# Patient Record
Sex: MALE | Race: WHITE | NOT HISPANIC OR LATINO | Employment: UNEMPLOYED | URBAN - METROPOLITAN AREA
[De-identification: names, ages, dates, MRNs, and addresses within clinical notes are randomized per-mention and may not be internally consistent; named-entity substitution may affect disease eponyms.]

---

## 2021-02-05 ENCOUNTER — HISTORICAL (OUTPATIENT)
Dept: ADMINISTRATIVE | Facility: HOSPITAL | Age: 46
End: 2021-02-05

## 2021-02-05 LAB
FLUAV AG UPPER RESP QL IA.RAPID: NEGATIVE
FLUBV AG UPPER RESP QL IA.RAPID: NEGATIVE
SARS-COV-2 RNA RESP QL NAA+PROBE: NEGATIVE
SARS-COV-2 RNA RESP QL NAA+PROBE: NOT DETECTED

## 2021-02-07 LAB — FINAL CULTURE: NORMAL

## 2021-09-02 ENCOUNTER — HISTORICAL (OUTPATIENT)
Dept: INFECTIOUS DISEASES | Facility: HOSPITAL | Age: 46
End: 2021-09-02

## 2022-03-21 ENCOUNTER — HISTORICAL (OUTPATIENT)
Dept: ADMINISTRATIVE | Facility: HOSPITAL | Age: 47
End: 2022-03-21

## 2022-04-10 ENCOUNTER — HISTORICAL (OUTPATIENT)
Dept: ADMINISTRATIVE | Facility: HOSPITAL | Age: 47
End: 2022-04-10
Payer: MEDICAID

## 2022-04-29 VITALS
WEIGHT: 209.44 LBS | OXYGEN SATURATION: 97 % | HEIGHT: 68 IN | SYSTOLIC BLOOD PRESSURE: 145 MMHG | BODY MASS INDEX: 31.74 KG/M2 | DIASTOLIC BLOOD PRESSURE: 96 MMHG

## 2022-05-31 ENCOUNTER — OFFICE VISIT (OUTPATIENT)
Dept: FAMILY MEDICINE | Facility: CLINIC | Age: 47
End: 2022-05-31
Payer: MEDICAID

## 2022-05-31 VITALS
HEIGHT: 67 IN | WEIGHT: 193.63 LBS | HEART RATE: 71 BPM | DIASTOLIC BLOOD PRESSURE: 82 MMHG | SYSTOLIC BLOOD PRESSURE: 123 MMHG | BODY MASS INDEX: 30.39 KG/M2

## 2022-05-31 DIAGNOSIS — I10 HYPERTENSION, UNSPECIFIED TYPE: ICD-10-CM

## 2022-05-31 DIAGNOSIS — Z13.220 LIPID SCREENING: ICD-10-CM

## 2022-05-31 DIAGNOSIS — Z76.89 ENCOUNTER TO ESTABLISH CARE: Primary | ICD-10-CM

## 2022-05-31 DIAGNOSIS — Z87.898 HISTORY OF SUBSTANCE USE: ICD-10-CM

## 2022-05-31 DIAGNOSIS — Z72.0 VAPES NICOTINE CONTAINING SUBSTANCE: ICD-10-CM

## 2022-05-31 DIAGNOSIS — Z12.5 SCREENING PSA (PROSTATE SPECIFIC ANTIGEN): ICD-10-CM

## 2022-05-31 DIAGNOSIS — F41.9 ANXIETY: ICD-10-CM

## 2022-05-31 PROCEDURE — 3079F PR MOST RECENT DIASTOLIC BLOOD PRESSURE 80-89 MM HG: ICD-10-PCS | Mod: CPTII,,, | Performed by: NURSE PRACTITIONER

## 2022-05-31 PROCEDURE — 3074F PR MOST RECENT SYSTOLIC BLOOD PRESSURE < 130 MM HG: ICD-10-PCS | Mod: CPTII,,, | Performed by: NURSE PRACTITIONER

## 2022-05-31 PROCEDURE — 1159F MED LIST DOCD IN RCRD: CPT | Mod: CPTII,,, | Performed by: NURSE PRACTITIONER

## 2022-05-31 PROCEDURE — 4010F ACE/ARB THERAPY RXD/TAKEN: CPT | Mod: CPTII,,, | Performed by: NURSE PRACTITIONER

## 2022-05-31 PROCEDURE — 4010F PR ACE/ARB THEARPY RXD/TAKEN: ICD-10-PCS | Mod: CPTII,,, | Performed by: NURSE PRACTITIONER

## 2022-05-31 PROCEDURE — 3074F SYST BP LT 130 MM HG: CPT | Mod: CPTII,,, | Performed by: NURSE PRACTITIONER

## 2022-05-31 PROCEDURE — 3008F PR BODY MASS INDEX (BMI) DOCUMENTED: ICD-10-PCS | Mod: CPTII,,, | Performed by: NURSE PRACTITIONER

## 2022-05-31 PROCEDURE — 1159F PR MEDICATION LIST DOCUMENTED IN MEDICAL RECORD: ICD-10-PCS | Mod: CPTII,,, | Performed by: NURSE PRACTITIONER

## 2022-05-31 PROCEDURE — 99204 OFFICE O/P NEW MOD 45 MIN: CPT | Mod: ,,, | Performed by: NURSE PRACTITIONER

## 2022-05-31 PROCEDURE — 3079F DIAST BP 80-89 MM HG: CPT | Mod: CPTII,,, | Performed by: NURSE PRACTITIONER

## 2022-05-31 PROCEDURE — 3008F BODY MASS INDEX DOCD: CPT | Mod: CPTII,,, | Performed by: NURSE PRACTITIONER

## 2022-05-31 PROCEDURE — 99204 PR OFFICE/OUTPT VISIT, NEW, LEVL IV, 45-59 MIN: ICD-10-PCS | Mod: ,,, | Performed by: NURSE PRACTITIONER

## 2022-05-31 RX ORDER — BUPROPION HYDROCHLORIDE 150 MG/1
150 TABLET ORAL DAILY
Qty: 30 TABLET | Refills: 11 | Status: SHIPPED | OUTPATIENT
Start: 2022-05-31 | End: 2023-12-06

## 2022-05-31 RX ORDER — OLMESARTAN MEDOXOMIL 20 MG/1
20 TABLET ORAL DAILY
Qty: 90 TABLET | Refills: 3 | Status: SHIPPED | OUTPATIENT
Start: 2022-05-31 | End: 2023-08-18

## 2022-05-31 RX ORDER — HYDROCHLOROTHIAZIDE 12.5 MG/1
12.5 CAPSULE ORAL DAILY
COMMUNITY
Start: 2022-04-29 | End: 2022-05-31 | Stop reason: SDUPTHER

## 2022-05-31 RX ORDER — HYDROCHLOROTHIAZIDE 12.5 MG/1
12.5 CAPSULE ORAL DAILY
Qty: 30 CAPSULE | Refills: 0 | Status: SHIPPED | OUTPATIENT
Start: 2022-05-31 | End: 2022-07-08

## 2022-05-31 NOTE — PROGRESS NOTES
Subjective:       Patient ID: Trever Mcduffie is a 46 y.o. male.    Chief Complaint: Hypertension, Depression, and Anxiety (Since lost weight taking half dose b/p x few weeks)      HPI   This is a 46-year-old  male presents to clinic today to establish as a new patient.  Patient reports like his PCP Dr. Adams.  Patient reports lasting about 6-8 months ago.  Patient with medical history of hypertension, anxiety, history heroin abuse, smoker patient vapes   Sobriety for 3 years.  Patient is requesting to restart Wellbutrin to help with anxiety and smoking cessation  .  Patient also reports for the last few months he has only been taking half of his only source and because he noticed his blood pressure had been dropping in afternoon to 90s   Doing well today denies SI, HI.  Denies chest pain , shortness of breath , dizziness or blurred vision Patient is a patient care tech at [employer]  Review of Systems   Constitutional: Negative for activity change and fatigue.   Respiratory: Negative for chest tightness and shortness of breath.    Cardiovascular: Negative for chest pain and palpitations.   Gastrointestinal: Negative for abdominal pain.   Neurological: Negative for dizziness, weakness and headaches.   Psychiatric/Behavioral: Negative for self-injury and suicidal ideas.   All other systems reviewed and are negative.          The patient's Health Maintenance was reviewed and the following appears to be due:   Health Maintenance Due   Topic Date Due    Hepatitis C Screening  Never done    Lipid Panel  Never done    COVID-19 Vaccine (1) Never done    Pneumococcal Vaccines (Age 0-64) (1 - PCV) Never done    HIV Screening  Never done    TETANUS VACCINE  Never done    Colorectal Cancer Screening  Never done       Past Medical History:  Past Medical History:   Diagnosis Date    Anxiety     Depression     Hypertension      Past Surgical History:   Procedure Laterality Date    HERNIA REPAIR  "     JOINT REPLACEMENT      right index finger  Right      Review of patient's allergies indicates:   Allergen Reactions    Meperidine Hives     Current Outpatient Medications on File Prior to Visit   Medication Sig Dispense Refill    hydroCHLOROthiazide (MICROZIDE) 12.5 mg capsule Take 12.5 mg by mouth once daily.      olmesartan medoxomil (OLMESARTAN ORAL) Take 40 mg by mouth once daily at 6am. Starting cutting in half x weeks on own       No current facility-administered medications on file prior to visit.     Social History     Socioeconomic History    Marital status:    Tobacco Use    Smoking status: Current Every Day Smoker    Smokeless tobacco: Never Used   Substance and Sexual Activity    Alcohol use: Never    Drug use: Never    Sexual activity: Not Currently     Family History   Problem Relation Age of Onset    Arthritis Mother     Heart disease Mother     Cancer Father          Objective:       /82   Pulse 71   Ht 5' 7" (1.702 m)   Wt 87.8 kg (193 lb 9.6 oz)   BMI 30.32 kg/m²      Physical Exam  Constitutional:       Appearance: Normal appearance.   HENT:      Head: Normocephalic.   Eyes:      Pupils: Pupils are equal, round, and reactive to light.   Cardiovascular:      Rate and Rhythm: Normal rate.      Pulses: Normal pulses.   Pulmonary:      Effort: Pulmonary effort is normal.   Abdominal:      General: Bowel sounds are normal.      Palpations: Abdomen is soft.   Musculoskeletal:         General: Normal range of motion.      Cervical back: Neck supple.   Skin:     General: Skin is warm and dry.   Neurological:      Mental Status: He is alert and oriented to person, place, and time.   Psychiatric:         Mood and Affect: Mood normal.         Behavior: Behavior normal.         Judgment: Judgment normal.           Assessment/Plan   1. Encounter to establish care  -     PSA, Screening  -     CBC Auto Differential  -     Comprehensive Metabolic Panel  -     Lipid Panel  -    "  Vitamin D  -     TSH  -     Hemoglobin A1C    2. Hypertension, unspecified type  -     CBC Auto Differential  -     Comprehensive Metabolic Panel  -     Lipid Panel  -     TSH  -     Hemoglobin A1C  Change olmmesartan 20 mg tablet once a day  Controlled with medication.   Continue current medication as prescribed   Low salt/ DASH diet   Discussed lifestyle modifications.   encourage aerobic excise at least 30 mins a day   Monitor BP daily goal less than 140/90.   Denies headaches, blurred vision, or dizziness      3. Vapes nicotine containing substance  Encouraged smoking cessation  4. Lipid screening  -     Comprehensive Metabolic Panel  -     Lipid Panel  -     Hemoglobin A1C    5. Screening PSA (prostate specific antigen)  -     PSA, Screening    6. History of substance use  Sober 3 years   7. Anxiety  Reinitiate Wellbutrin 150 mg daily       Follow up in about 3 weeks (around 6/21/2022) for Wellness.

## 2022-05-31 NOTE — PATIENT INSTRUCTIONS
Controlled with medication.   Continue current medication as prescribed   Low salt/ DASH diet   Discussed lifestyle modifications.   encourage aerobic excise at least 30 mins a day   Monitor BP daily goal less than 140/90.   Denies headaches, blurred vision, or dizziness

## 2022-11-21 DIAGNOSIS — I10 HYPERTENSION, UNSPECIFIED TYPE: ICD-10-CM

## 2022-11-21 RX ORDER — HYDROCHLOROTHIAZIDE 12.5 MG/1
12.5 CAPSULE ORAL DAILY
Qty: 30 CAPSULE | Refills: 0 | Status: SHIPPED | OUTPATIENT
Start: 2022-11-21 | End: 2023-12-06

## 2023-04-08 ENCOUNTER — HOSPITAL ENCOUNTER (EMERGENCY)
Facility: HOSPITAL | Age: 48
Discharge: HOME | End: 2023-04-08
Attending: SPECIALIST
Payer: MEDICAID

## 2023-04-08 VITALS
HEIGHT: 67 IN | OXYGEN SATURATION: 98 % | HEART RATE: 106 BPM | DIASTOLIC BLOOD PRESSURE: 85 MMHG | WEIGHT: 195 LBS | RESPIRATION RATE: 20 BRPM | TEMPERATURE: 98 F | SYSTOLIC BLOOD PRESSURE: 138 MMHG | BODY MASS INDEX: 30.61 KG/M2

## 2023-04-08 DIAGNOSIS — L25.9 CONTACT DERMATITIS, UNSPECIFIED CONTACT DERMATITIS TYPE, UNSPECIFIED TRIGGER: Primary | ICD-10-CM

## 2023-04-08 PROCEDURE — 63600175 PHARM REV CODE 636 W HCPCS: Performed by: SPECIALIST

## 2023-04-08 PROCEDURE — 25000003 PHARM REV CODE 250: Performed by: SPECIALIST

## 2023-04-08 PROCEDURE — 96372 THER/PROPH/DIAG INJ SC/IM: CPT | Performed by: SPECIALIST

## 2023-04-08 PROCEDURE — 99284 EMERGENCY DEPT VISIT MOD MDM: CPT

## 2023-04-08 RX ORDER — PREDNISONE 20 MG/1
20 TABLET ORAL 2 TIMES DAILY
Qty: 6 TABLET | Refills: 0 | Status: SHIPPED | OUTPATIENT
Start: 2023-04-08 | End: 2023-04-11

## 2023-04-08 RX ORDER — CEPHALEXIN 500 MG/1
500 CAPSULE ORAL
Status: COMPLETED | OUTPATIENT
Start: 2023-04-08 | End: 2023-04-08

## 2023-04-08 RX ORDER — DEXAMETHASONE SODIUM PHOSPHATE 4 MG/ML
8 INJECTION, SOLUTION INTRA-ARTICULAR; INTRALESIONAL; INTRAMUSCULAR; INTRAVENOUS; SOFT TISSUE
Status: COMPLETED | OUTPATIENT
Start: 2023-04-08 | End: 2023-04-08

## 2023-04-08 RX ORDER — CEPHALEXIN 500 MG/1
500 CAPSULE ORAL EVERY 12 HOURS
Qty: 14 CAPSULE | Refills: 0 | Status: SHIPPED | OUTPATIENT
Start: 2023-04-08 | End: 2023-04-15

## 2023-04-08 RX ADMIN — DEXAMETHASONE SODIUM PHOSPHATE 8 MG: 4 INJECTION, SOLUTION INTRA-ARTICULAR; INTRALESIONAL; INTRAMUSCULAR; INTRAVENOUS; SOFT TISSUE at 08:04

## 2023-04-08 RX ADMIN — CEPHALEXIN 500 MG: 500 CAPSULE ORAL at 08:04

## 2023-04-09 NOTE — ED PROVIDER NOTES
"Encounter Date: 4/8/2023       History     Chief Complaint   Patient presents with    Insect Bite     Pt was working in his yard a few days ago, did not feel any bite or sting but that night he felt itchy, reddened area and applied cream/ L lower leg area removed a black "stinger"/ today noticed the area looked infected and became concerned.     Patient with bilateral lower extremity rash also on right forearm after working in the yard a few days ago; thought he might have had a thorn or a stinger in his left lower leg and he pulled it out in the area became more inflamed    The history is provided by the patient.   Review of patient's allergies indicates:   Allergen Reactions    Meperidine Hives     Past Medical History:   Diagnosis Date    Anxiety     Depression     Hypertension      Past Surgical History:   Procedure Laterality Date    HERNIA REPAIR      JOINT REPLACEMENT      right index finger  Right      Family History   Problem Relation Age of Onset    Arthritis Mother     Heart disease Mother     Cancer Father      Social History     Tobacco Use    Smoking status: Every Day    Smokeless tobacco: Never   Substance Use Topics    Alcohol use: Never    Drug use: Never     Review of Systems   Constitutional: Negative.    HENT: Negative.     Respiratory: Negative.     Cardiovascular: Negative.    Gastrointestinal: Negative.    Genitourinary: Negative.    Musculoskeletal: Negative.    Neurological: Negative.      Physical Exam     Initial Vitals [04/08/23 2031]   BP Pulse Resp Temp SpO2   138/85 106 20 97.8 °F (36.6 °C) 98 %      MAP       --         Physical Exam    Nursing note and vitals reviewed.  Constitutional: He appears well-developed and well-nourished.   HENT:   Head: Normocephalic and atraumatic.   Eyes: EOM are normal. Pupils are equal, round, and reactive to light.   Neck:   Normal range of motion.  Cardiovascular:  Normal rate, regular rhythm and normal heart sounds.           Pulmonary/Chest: Breath " sounds normal.   Abdominal: Abdomen is soft.   Musculoskeletal:         General: Normal range of motion.      Cervical back: Normal range of motion.     Neurological: He is alert and oriented to person, place, and time.   Skin: Skin is warm and dry.   Erythematous eruption bilateral lower extremities and left forearm       ED Course   Procedures  Labs Reviewed - No data to display       Imaging Results    None          Medications   dexAMETHasone injection 8 mg (8 mg Intramuscular Given 4/8/23 2056)   cephALEXin capsule 500 mg (500 mg Oral Given 4/8/23 2056)     Medical Decision Making:   Differential Diagnosis:   Contact dermatitis, insect bite, cellulitis  ED Management:  Will give steroid and antibiotic                        Clinical Impression:   Final diagnoses:  [L25.9] Contact dermatitis, unspecified contact dermatitis type, unspecified trigger (Primary)        ED Disposition Condition    Discharge Stable          ED Prescriptions       Medication Sig Dispense Start Date End Date Auth. Provider    predniSONE (DELTASONE) 20 MG tablet Take 1 tablet (20 mg total) by mouth 2 (two) times daily. for 3 days 6 tablet 4/8/2023 4/11/2023 Marcell Watters MD    cephALEXin (KEFLEX) 500 MG capsule Take 1 capsule (500 mg total) by mouth every 12 (twelve) hours. for 7 days 14 capsule 4/8/2023 4/15/2023 Marcell Watters MD          Follow-up Information       Follow up With Specialties Details Why Contact [facility] In 3 days As needed [address]  [facility] [address]  [phone]               Marcell Watters MD  04/08/23 9023

## 2023-08-18 DIAGNOSIS — I10 HYPERTENSION, UNSPECIFIED TYPE: ICD-10-CM

## 2023-08-18 RX ORDER — OLMESARTAN MEDOXOMIL 20 MG/1
20 TABLET ORAL
Qty: 30 TABLET | Refills: 1 | Status: SHIPPED | OUTPATIENT
Start: 2023-08-18 | End: 2023-11-06

## 2023-11-05 DIAGNOSIS — I10 HYPERTENSION, UNSPECIFIED TYPE: ICD-10-CM

## 2023-11-06 RX ORDER — OLMESARTAN MEDOXOMIL 20 MG/1
20 TABLET ORAL
Qty: 30 TABLET | Refills: 0 | Status: SHIPPED | OUTPATIENT
Start: 2023-11-06 | End: 2023-12-06 | Stop reason: SDUPTHER

## 2023-11-28 DIAGNOSIS — Z12.5 SCREENING PSA (PROSTATE SPECIFIC ANTIGEN): ICD-10-CM

## 2023-11-28 DIAGNOSIS — Z00.00 WELLNESS EXAMINATION: Primary | ICD-10-CM

## 2023-12-06 ENCOUNTER — OFFICE VISIT (OUTPATIENT)
Dept: FAMILY MEDICINE | Facility: CLINIC | Age: 48
End: 2023-12-06
Payer: MEDICAID

## 2023-12-06 VITALS
RESPIRATION RATE: 18 BRPM | HEART RATE: 82 BPM | DIASTOLIC BLOOD PRESSURE: 86 MMHG | OXYGEN SATURATION: 98 % | BODY MASS INDEX: 35.14 KG/M2 | TEMPERATURE: 98 F | WEIGHT: 223.88 LBS | SYSTOLIC BLOOD PRESSURE: 128 MMHG | HEIGHT: 67 IN

## 2023-12-06 DIAGNOSIS — Z12.11 SCREENING FOR MALIGNANT NEOPLASM OF COLON: ICD-10-CM

## 2023-12-06 DIAGNOSIS — Z72.0 VAPES NICOTINE CONTAINING SUBSTANCE: ICD-10-CM

## 2023-12-06 DIAGNOSIS — Z12.5 SCREENING FOR PROSTATE CANCER: ICD-10-CM

## 2023-12-06 DIAGNOSIS — Z00.00 WELLNESS EXAMINATION: Primary | ICD-10-CM

## 2023-12-06 DIAGNOSIS — I10 HYPERTENSION, UNSPECIFIED TYPE: ICD-10-CM

## 2023-12-06 PROCEDURE — 99406 PR TOBACCO USE CESSATION INTERMEDIATE 3-10 MINUTES: ICD-10-PCS | Mod: ,,, | Performed by: NURSE PRACTITIONER

## 2023-12-06 PROCEDURE — 99396 PR PREVENTIVE VISIT,EST,40-64: ICD-10-PCS | Mod: 25,,, | Performed by: NURSE PRACTITIONER

## 2023-12-06 PROCEDURE — 3079F DIAST BP 80-89 MM HG: CPT | Mod: CPTII,,, | Performed by: NURSE PRACTITIONER

## 2023-12-06 PROCEDURE — 3008F BODY MASS INDEX DOCD: CPT | Mod: CPTII,,, | Performed by: NURSE PRACTITIONER

## 2023-12-06 PROCEDURE — 1159F PR MEDICATION LIST DOCUMENTED IN MEDICAL RECORD: ICD-10-PCS | Mod: CPTII,,, | Performed by: NURSE PRACTITIONER

## 2023-12-06 PROCEDURE — 3074F PR MOST RECENT SYSTOLIC BLOOD PRESSURE < 130 MM HG: ICD-10-PCS | Mod: CPTII,,, | Performed by: NURSE PRACTITIONER

## 2023-12-06 PROCEDURE — 3074F SYST BP LT 130 MM HG: CPT | Mod: CPTII,,, | Performed by: NURSE PRACTITIONER

## 2023-12-06 PROCEDURE — 4010F PR ACE/ARB THEARPY RXD/TAKEN: ICD-10-PCS | Mod: CPTII,,, | Performed by: NURSE PRACTITIONER

## 2023-12-06 PROCEDURE — 1159F MED LIST DOCD IN RCRD: CPT | Mod: CPTII,,, | Performed by: NURSE PRACTITIONER

## 2023-12-06 PROCEDURE — 4010F ACE/ARB THERAPY RXD/TAKEN: CPT | Mod: CPTII,,, | Performed by: NURSE PRACTITIONER

## 2023-12-06 PROCEDURE — 99406 BEHAV CHNG SMOKING 3-10 MIN: CPT | Mod: ,,, | Performed by: NURSE PRACTITIONER

## 2023-12-06 PROCEDURE — 99396 PREV VISIT EST AGE 40-64: CPT | Mod: 25,,, | Performed by: NURSE PRACTITIONER

## 2023-12-06 PROCEDURE — 3079F PR MOST RECENT DIASTOLIC BLOOD PRESSURE 80-89 MM HG: ICD-10-PCS | Mod: CPTII,,, | Performed by: NURSE PRACTITIONER

## 2023-12-06 PROCEDURE — 3008F PR BODY MASS INDEX (BMI) DOCUMENTED: ICD-10-PCS | Mod: CPTII,,, | Performed by: NURSE PRACTITIONER

## 2023-12-06 RX ORDER — OLMESARTAN MEDOXOMIL 20 MG/1
20 TABLET ORAL DAILY
Qty: 30 TABLET | Refills: 2 | Status: SHIPPED | OUTPATIENT
Start: 2023-12-06 | End: 2024-02-28

## 2023-12-06 NOTE — PROGRESS NOTES
SUBJECTIVE:     History of Present Illness      Chief Complaint: Follow-up (Returns for medication refills.  No complaints at this time.  Needs to do labs & wellness visit.) and Annual Exam    HPI:  Patient is a 48 y.o. year old male who presents to clinic for wellness and medication refill.    Patient has not completed his lab work at this time.    Requesting refill on losartan.   Last visit in clinic May 2022.    Patient reports general lifestyle is healthy.    Drinks 1 cup of coffee or soda daily.   Patient does vape nicotine containing products.   Sober for 3 years os substance abuse   Pt wears glasses, reports his  last eye exam a year ago .    Patient has no complaints today.  Denies chest pain, shortness of breath, blurred vision, palpitations    Review of Systems:    Review of Systems    12 point review of systems conducted, negative except as stated in the history of present illness. See HPI for details.     Previous History    Shantel Bush, [facility]  Review of patient's allergies indicates:   Allergen Reactions    Meperidine Hives       Past Medical History:   Diagnosis Date    Anxiety     Depression     Hypertension      Current Outpatient Medications   Medication Instructions    olmesartan (BENICAR) 20 mg, Oral, Daily     Past Surgical History:   Procedure Laterality Date    HERNIA REPAIR      JOINT REPLACEMENT      right index finger  Right      Family History   Problem Relation Age of Onset    Arthritis Mother     Heart disease Mother     Cancer Father        Social History     Tobacco Use    Smoking status: Every Day     Types: Vaping with nicotine    Smokeless tobacco: Never   Substance Use Topics    Alcohol use: Never    Drug use: Never        Health Maintenance      Health Maintenance   Topic Date Due    Hepatitis C Screening  Never done    Lipid Panel  Never done    Colorectal Cancer Screening  Never done    TETANUS VACCINE  04/30/2032       OBJECTIVE:     Physical Exam      Vital Signs Reviewed  "  Visit Vitals  /86 (BP Location: Left arm, Patient Position: Sitting)   Pulse 82   Temp 97.8 °F (36.6 °C) (Oral)   Resp 18   Ht 5' 7" (1.702 m)   Wt 101.6 kg (223 lb 14.4 oz)   SpO2 98%   BMI 35.07 kg/m²       Physical Exam    Physical Exam:  General: Alert, well nourished, no acute distress, non-toxic appearing.   Eyes: Anicteric sclera, without conjunctival injection, normal lids, no purulent drainage, EOMs grossly intact.   Ears: No tragal tenderness. Tympanic membranes intact, pearly grey, without effusion or erythema and with a positive light reflex.   Mouth: Posterior pharynx without erythema. No exudate, ulcerations, or lesion. No tonsillar swelling.   Neck: Supple, full ROM, no rigidity, no cervical adenopathy.   Cardio: Normal rate and rhythm    Resp: Respirations even and unlabored, clear to auscultation bilaterally.   Abd: No ecchymosis or distension. Normal bowel sounds in all 4 quadrants. No tenderness to palpation. No rebound tenderness or guarding. No CVA tenderness.   Skin: No rashes or open lesions noted.   MSK: No swelling. No abrasions or signs of trauma. Ambulating without assistance.   Neuro: Alert,oriented No focal deficits noted. Facial expressions even.   Psych: Cooperative, Normal affect      Procedures    Procedures     Labs     Results for orders placed or performed in visit on 02/05/21   Strep Only Culture    Specimen: Throat   Result Value Ref Range    FINAL CULTURE No growth of Beta Strep    COVID-19 Routine Screening   Result Value Ref Range    SARS-CoV-2 PCR Negative    FLU A & B PCR   Result Value Ref Range    Influenza A PCR Negative >Negative    Influenza B PCR Negative >Negative   COVID-19 Routine Screening   Result Value Ref Range    SARS-CoV-2 PCR Not Detected >Not Detected       Chemistry:  Lab Results   Component Value Date     04/29/2022    K 4.5 04/29/2022    CHLORIDE 103 04/29/2022    BUN 14.9 04/29/2022    CREATININE 0.98 04/29/2022    GLUCOSE 92 04/29/2022    " "CALCIUM 9.5 04/29/2022    ALKPHOS 59 04/29/2022    LABPROT 7.2 04/29/2022    ALBUMIN 4.1 04/29/2022    BILIDIR 0.3 04/29/2022    IBILI 0.40 04/29/2022    AST 20 04/29/2022    ALT 20 04/29/2022    TSH 0.166 (L) 05/26/2018        No results found for: "HGBA1C", "MICROALBCREA"     Hematology:  Lab Results   Component Value Date    WBC 7.2 03/21/2022    HGB 14.6 03/21/2022    HCT 43.0 03/21/2022     03/21/2022       Lipid Panel:  No results found for: "CHOL", "HDL", "LDL", "TRIG", "TOTALCHOLEST"     Urine:  Lab Results   Component Value Date    PHUA 6.0 05/26/2018         Assessment            ICD-10-CM ICD-9-CM   1. Wellness examination  Z00.00 V70.0   2. Hypertension, unspecified type  I10 401.9   3. Screening for malignant neoplasm of colon  Z12.11 V76.51   4. Screening for prostate cancer  Z12.5 V76.44   5. Vapes nicotine containing substance  Z72.0 305.1       Plan       1. Hypertension, unspecified type  - olmesartan (BENICAR) 20 MG tablet; Take 1 tablet (20 mg total) by mouth once daily.  Dispense: 30 tablet; Refill: 2    Stable Controlled with medication.   Continue current medication as prescribed   Low salt/ DASH diet   Discussed lifestyle modifications.   encourage aerobic excise at least 30 mins a day   Monitor BP daily goal less than 140/90.   Denies headaches, blurred vision, or dizziness      2. Wellness examination  Discussed  importance of completing labs and preventative screenings    Labs not completed   Overall health status reviewed.    Significant chronic conditions addressed, including ongoing treatment plans.   Good health habits reinforced.    Cardiovascular disease risk factors discussed.   Appropriate recommendations and preventative care medical   information provided with annual wellness exams encouraged.  Vaccination status     Colon  PSA ordered      3. Screening for malignant neoplasm of colon  - Ambulatory referral/consult to General Surgery; Future  Dr. AGATA Wilson     4. " Screening for prostate cancer  PSA ordered     5. Vapes nicotine containing substance  The patient was counseled on the dangers of tobacco use, and was advised to quit.  Reviewed strategies to maximize success, including substitution of other forms of reinforcement.    Orders Placed This Encounter    Ambulatory referral/consult to General Surgery    olmesartan (BENICAR) 20 MG tablet      Medication List with Changes/Refills   Changed and/or Refilled Medications    Modified Medication Previous Medication    OLMESARTAN (BENICAR) 20 MG TABLET olmesartan (BENICAR) 20 MG tablet       Take 1 tablet (20 mg total) by mouth once daily.    TAKE 1 TABLET(20 MG) BY MOUTH EVERY DAY   Discontinued Medications    BUPROPION (WELLBUTRIN XL) 150 MG TB24 TABLET    Take 1 tablet (150 mg total) by mouth once daily.    HYDROCHLOROTHIAZIDE (MICROZIDE) 12.5 MG CAPSULE    Take 1 capsule (12.5 mg total) by mouth once daily.       Follow up in about 3 months (around 3/6/2024) for 3 month.   Follow up in about 3 months (around 3/6/2024) for 3 month. In addition to their scheduled follow up, the patient has also been instructed to follow up on as needed basis.   Future Appointments   Date Time Provider Department Center   3/6/2024  8:30 AM Shantel Bush FNP [facility]

## 2023-12-06 NOTE — PATIENT INSTRUCTIONS
Valente Perera,     If you are due for any health screening(s) below please notify me so we can arrange them to be ordered and scheduled. Most healthy patients at your age complete them, but you are free to accept or refuse.     If you can't do it, I'll definitely understand. If you can, I'd certainly appreciate it!    Tests to Keep You Healthy    Colon Cancer Screening: DUE  Last Blood Pressure <= 139/89 (12/6/2023): NO      Its time for your colon cancer screening     Colorectal cancer is one of the leading causes of cancer death for men and women but it doesnt have to be. Screenings can prevent colorectal cancer or find it early enough to treat and cure the disease.     Our records indicate that you may be overdue for colon cancer screening. A colonoscopy or stool screening test can help identify patients at risk for developing colon cancer. Cancer screenings save lives, so schedule yours today to stay healthy.     A colonoscopy is the preferred test for detecting colon cancer. It is needed only once every 10 years if results are negative. While you are sedated, a flexible, lighted tube with a tiny camera is inserted into the rectum and advanced through the colon to look for cancers.     An alternative screening test that is used at home and returned to the lab may also be used. It detects hidden blood in bowel movements which could indicate cancer in the colon. If results are positive, you will need a colonoscopy to determine if the blood is a sign of cancer. This type of follow up (diagnostic) colonoscopy usually requires additional copays as required by your insurance provider.     If you recently had your colon cancer screening performed outside of [facility], please let your Health care team know so that they can update your health record. Please contact your PCP if you have any questions.    Were here to help you quit smoking     Our records indicated that you are still smoking. One of the best  things you can do for your health is to stop smoking and we are here to help.     Talk with your provider about our Smoking Cessation Program and how we can support you on your journey.

## 2024-02-28 DIAGNOSIS — I10 HYPERTENSION, UNSPECIFIED TYPE: ICD-10-CM

## 2024-02-28 RX ORDER — OLMESARTAN MEDOXOMIL 20 MG/1
20 TABLET ORAL
Qty: 30 TABLET | Refills: 2 | Status: SHIPPED | OUTPATIENT
Start: 2024-02-28 | End: 2024-05-28 | Stop reason: SDUPTHER

## 2024-05-25 DIAGNOSIS — I10 HYPERTENSION, UNSPECIFIED TYPE: ICD-10-CM

## 2024-05-28 DIAGNOSIS — I10 HYPERTENSION, UNSPECIFIED TYPE: ICD-10-CM

## 2024-05-28 RX ORDER — OLMESARTAN MEDOXOMIL 20 MG/1
20 TABLET ORAL DAILY
Qty: 30 TABLET | Refills: 2 | Status: SHIPPED | OUTPATIENT
Start: 2024-05-28

## 2024-05-28 RX ORDER — OLMESARTAN MEDOXOMIL 20 MG/1
20 TABLET ORAL
Qty: 30 TABLET | Refills: 2 | OUTPATIENT
Start: 2024-05-28

## 2024-09-10 DIAGNOSIS — I10 HYPERTENSION, UNSPECIFIED TYPE: ICD-10-CM

## 2024-09-10 RX ORDER — OLMESARTAN MEDOXOMIL 20 MG/1
TABLET ORAL
Qty: 30 TABLET | Refills: 2 | Status: SHIPPED | OUTPATIENT
Start: 2024-09-10

## 2024-12-03 DIAGNOSIS — Z00.00 WELLNESS EXAMINATION: ICD-10-CM

## 2024-12-03 DIAGNOSIS — Z12.5 SCREENING PSA (PROSTATE SPECIFIC ANTIGEN): ICD-10-CM

## 2024-12-03 DIAGNOSIS — I10 HYPERTENSION, UNSPECIFIED TYPE: Primary | ICD-10-CM

## 2024-12-04 ENCOUNTER — TELEPHONE (OUTPATIENT)
Dept: FAMILY MEDICINE | Facility: CLINIC | Age: 49
End: 2024-12-04
Payer: MEDICAID

## 2024-12-17 DIAGNOSIS — I10 HYPERTENSION, UNSPECIFIED TYPE: ICD-10-CM

## 2024-12-18 RX ORDER — OLMESARTAN MEDOXOMIL 20 MG/1
TABLET ORAL
Qty: 30 TABLET | Refills: 0 | Status: SHIPPED | OUTPATIENT
Start: 2024-12-18

## 2025-01-06 ENCOUNTER — HOSPITAL ENCOUNTER (EMERGENCY)
Facility: HOSPITAL | Age: 50
Discharge: HOME | End: 2025-01-06
Attending: FAMILY MEDICINE
Payer: COMMERCIAL

## 2025-01-06 VITALS
HEART RATE: 113 BPM | OXYGEN SATURATION: 98 % | SYSTOLIC BLOOD PRESSURE: 151 MMHG | WEIGHT: 215 LBS | HEIGHT: 66 IN | RESPIRATION RATE: 18 BRPM | TEMPERATURE: 98 F | BODY MASS INDEX: 34.55 KG/M2 | DIASTOLIC BLOOD PRESSURE: 85 MMHG

## 2025-01-06 DIAGNOSIS — S46.212A BICEPS MUSCLE TEAR, LEFT, INITIAL ENCOUNTER: Primary | ICD-10-CM

## 2025-01-06 PROCEDURE — 99284 EMERGENCY DEPT VISIT MOD MDM: CPT

## 2025-01-06 RX ORDER — KETOROLAC TROMETHAMINE 10 MG/1
10 TABLET, FILM COATED ORAL EVERY 6 HOURS
Qty: 15 TABLET | Refills: 0 | Status: SHIPPED | OUTPATIENT
Start: 2025-01-06 | End: 2025-01-11

## 2025-01-06 RX ORDER — CYCLOBENZAPRINE HCL 10 MG
10 TABLET ORAL 3 TIMES DAILY PRN
Qty: 15 TABLET | Refills: 0 | Status: SHIPPED | OUTPATIENT
Start: 2025-01-06 | End: 2025-01-11

## 2025-01-06 NOTE — Clinical Note
"Trever Fuller" Froy was seen and treated in our emergency department on 1/6/2025.  He may return to work on 01/13/2025.       If you have any questions or concerns, please don't hesitate to call.      Misbah Franks MD"

## 2025-01-06 NOTE — ED PROVIDER NOTES
"Encounter Date: 1/6/2025       History     Chief Complaint   Patient presents with    Arm Injury     C/o L arm pain and bruising.  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 has not felt a pop in his biceps area appears to have most likely a possible partially turned biceps still has range of motion but does have some bruising and obvious deformity in the biceps muscle we will give some pain control recommend he sees Orthopedics follow up this week patient understands and agrees with the plan        Review of patient's allergies indicates:   Allergen Reactions    Meperidine Hives     Past Medical History:   Diagnosis Date    Anxiety     Depression     Hypertension      Past Surgical History:   Procedure Laterality Date    HERNIA REPAIR      JOINT REPLACEMENT      right index finger  Right      Family History   Problem Relation Name Age of Onset    Arthritis Mother      Heart disease Mother      Cancer Father       Social History     Tobacco Use    Smoking status: Every Day     Types: Vaping with nicotine    Smokeless tobacco: Never   Substance Use Topics    Alcohol use: Never    Drug use: Never     Review of Systems   Musculoskeletal:  Positive for myalgias.   All other systems reviewed and are negative.      Physical Exam     Initial Vitals [01/06/25 1044]   BP Pulse Resp Temp SpO2   (!) 151/85 (!) 113 18 98.3 °F (36.8 °C) 98 %      MAP       --         Physical Exam    Nursing note and vitals reviewed.  Constitutional: He appears well-developed and well-nourished. He is active.   HENT:   Head: Normocephalic and atraumatic.   Eyes: Conjunctivae, EOM and lids are normal. Pupils are equal, round, and reactive to light.   Neck: Trachea normal and phonation normal. Neck supple. No thyroid mass present.   Normal range of motion.  Cardiovascular:  Normal rate, " regular rhythm, normal heart sounds and normal pulses.     Exam reveals no friction rub.       No murmur heard.  Pulmonary/Chest: Breath sounds normal. He has no wheezes. He has no rhonchi. He has no rales.   Abdominal: He exhibits no distension. There is no abdominal tenderness. There is no rebound and no guarding.   Musculoskeletal:         General: Tenderness present. Normal range of motion.      Cervical back: Normal range of motion and neck supple.      Comments: Bruising tenderness of the left bicep forearm area appears to have partial biceps tear     Neurological: He is alert. No cranial nerve deficit.   Skin: Skin is warm, dry and intact.   Psychiatric: He has a normal mood and affect. His speech is normal and behavior is normal. Judgment and thought content normal. Cognition and memory are normal.         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  Risk Details: Differential diagnosis muscle strain muscle tear tendon rupture                                      Clinical Impression:  Final diagnoses:  [S42.212A] Biceps muscle tear, left, initial encounter (Primary)          ED Disposition Condition    Discharge Stable          ED Prescriptions       Medication Sig Dispense Start Date End Date Auth. Provider    cyclobenzaprine (FLEXERIL) 10 MG tablet Take 1 tablet (10 mg total) by mouth 3 (three) times daily as needed for  Muscle spasms. 15 tablet 1/6/2025 1/11/2025 Misbah Franks MD    ketorolac (TORADOL) 10 mg tablet Take 1 tablet (10 mg total) by mouth every 6 (six) hours. for 5 days 15 tablet 1/6/2025 1/11/2025 Misbah Franks MD          Follow-up Information       Follow up With Specialties Details Why Contact Info    [facility] In 2 days  [address] [facility]  [place] [address]  [phone]      Junaid Green MD Orthopedic Surgery Call   [address] [place] [address]  [phone]               Misbah Franks MD  01/06/25 1121

## 2025-01-06 NOTE — DISCHARGE INSTRUCTIONS
Recommend call Dr. Green orthopedics get follow up appointment take meds as prescribed no lifting or using left arm still get seen by ortho

## 2025-01-09 ENCOUNTER — OFFICE VISIT (OUTPATIENT)
Dept: ORTHOPEDICS | Facility: CLINIC | Age: 50
End: 2025-01-09
Payer: OTHER MISCELLANEOUS

## 2025-01-09 ENCOUNTER — HOSPITAL ENCOUNTER (OUTPATIENT)
Dept: RADIOLOGY | Facility: CLINIC | Age: 50
Discharge: HOME | End: 2025-01-09
Attending: ORTHOPAEDIC SURGERY
Payer: COMMERCIAL

## 2025-01-09 VITALS
DIASTOLIC BLOOD PRESSURE: 91 MMHG | BODY MASS INDEX: 34.91 KG/M2 | HEIGHT: 66 IN | HEART RATE: 100 BPM | WEIGHT: 217.19 LBS | SYSTOLIC BLOOD PRESSURE: 144 MMHG

## 2025-01-09 DIAGNOSIS — M25.529 ELBOW PAIN, UNSPECIFIED LATERALITY: Primary | ICD-10-CM

## 2025-01-09 DIAGNOSIS — S46.212A RUPTURE OF LEFT DISTAL BICEPS TENDON, INITIAL ENCOUNTER: ICD-10-CM

## 2025-01-09 DIAGNOSIS — M25.529 ELBOW PAIN, UNSPECIFIED LATERALITY: ICD-10-CM

## 2025-01-09 PROCEDURE — 73080 X-RAY EXAM OF ELBOW: CPT | Mod: LT,,, | Performed by: ORTHOPAEDIC SURGERY

## 2025-01-09 RX ORDER — TRAMADOL HYDROCHLORIDE 50 MG/1
50 TABLET ORAL EVERY 8 HOURS PRN
Qty: 21 EACH | Refills: 0 | Status: SHIPPED | OUTPATIENT
Start: 2025-01-09

## 2025-01-09 NOTE — PROGRESS NOTES
"Subjective:    CC: Injury of the Left Upper Arm (Patient is here for Lt biceps injury, DOI: 1/4/25. 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 Arm is bruised very bad by inner elbow. )       HPI:  Patient comes in today for his 1st visit.  Patient complains of left arm and elbow pain.  Patient states he was restraining a patient, when he felt several pops in his elbow.  He denies any previous injuries.  Since then he has had a lot of bruising swelling loss of motion of his elbow.  He denies any numbness or tingling, he denies other complaints.    ROS: Refer to HPI for pertinent ROS. All other 12 point systems negative.    Objective:  Vitals:    01/09/25 0959   BP: (!) 144/91  Comment: In pain.   BP Location: Right arm   Patient Position: Sitting   Pulse: 100   Weight: 98.5 kg (217 lb 3.2 oz)   Height: 5' 6" (1.676 m)        Physical Exam:  Patient is well-nourished developed male he is awake alert and oriented x3 skin apparent stress is pleasant and cooperative.  Examination of the left upper extremity compartments are soft and warm.  Skin is intact.  There is no signs symptoms of DVT or infection.  He does have a large amount of bruising, swelling along the arm and forearm region.  He is point tender along the cubital fossa.  Questionable hook sign.  His motion is 10-90 degrees stable to stressing, neurovascular intact distally.  He does have pain with resisted pronation supination at the elbow.  He is nontender about the proximal humerus    Images:  X-rays two views left elbow demonstrate no obvious fracture or dislocation. Images Reviewed and discussed with patient.    Assessment:  1. Elbow pain, unspecified laterality  - X-Ray Elbow Complete Left; Future    2. Rupture of left distal biceps tendon, initial encounter  - MRI Elbow Joint Without Contrast Left  - traMADoL (ULTRAM) 50 mg tablet; Take 1 tablet (50 mg total) by mouth every 8 (eight) " hours as needed for Pain.  Dispense: 21 each; Refill: 0        Plan:  This time we discussed his physical exam and x-ray findings.  I am concerned for a complete biceps tendon rupture.  He will be placed in his sling, nonweightbearing okay for gentle motion.  We will proceed with an MRI of his left elbow.  We have discussed follow up with Dr. Mcgowan, we will set this up at his convenience.    Follow UP: No follow-ups on file.

## 2025-01-16 ENCOUNTER — OFFICE VISIT (OUTPATIENT)
Dept: ORTHOPEDICS | Facility: CLINIC | Age: 50
End: 2025-01-16
Payer: OTHER MISCELLANEOUS

## 2025-01-16 VITALS
SYSTOLIC BLOOD PRESSURE: 140 MMHG | WEIGHT: 217.13 LBS | HEART RATE: 107 BPM | DIASTOLIC BLOOD PRESSURE: 95 MMHG | BODY MASS INDEX: 34.9 KG/M2 | HEIGHT: 66 IN

## 2025-01-16 DIAGNOSIS — Z02.6 ENCOUNTER RELATED TO WORKER'S COMPENSATION CLAIM: ICD-10-CM

## 2025-01-16 DIAGNOSIS — S46.212A RUPTURE OF LEFT DISTAL BICEPS TENDON, INITIAL ENCOUNTER: Primary | ICD-10-CM

## 2025-01-16 PROCEDURE — 99214 OFFICE O/P EST MOD 30 MIN: CPT | Mod: ,,, | Performed by: ORTHOPAEDIC SURGERY

## 2025-01-16 RX ORDER — ACETAMINOPHEN 500 MG
1000 TABLET ORAL
Status: CANCELLED | OUTPATIENT
Start: 2025-01-16

## 2025-01-16 RX ORDER — METHOCARBAMOL 750 MG/1
750 TABLET, FILM COATED ORAL 3 TIMES DAILY PRN
Qty: 60 TABLET | Refills: 0 | Status: ON HOLD | OUTPATIENT
Start: 2025-01-16 | End: 2025-01-27 | Stop reason: HOSPADM

## 2025-01-16 RX ORDER — NAPROXEN SODIUM 220 MG
220 TABLET ORAL
Status: ON HOLD | COMMUNITY
End: 2025-01-27 | Stop reason: HOSPADM

## 2025-01-16 RX ORDER — SCOLOPAMINE TRANSDERMAL SYSTEM 1 MG/1
1 PATCH, EXTENDED RELEASE TRANSDERMAL ONCE AS NEEDED
OUTPATIENT
Start: 2025-01-16 | End: 2036-06-14

## 2025-01-16 RX ORDER — KETOROLAC TROMETHAMINE 10 MG/1
10 TABLET, FILM COATED ORAL ONCE
Status: CANCELLED | OUTPATIENT
Start: 2025-01-16 | End: 2025-01-21

## 2025-01-16 RX ORDER — TRAMADOL HYDROCHLORIDE 50 MG/1
50 TABLET ORAL EVERY 8 HOURS PRN
Qty: 21 TABLET | Refills: 0 | Status: SHIPPED | OUTPATIENT
Start: 2025-01-16 | End: 2025-01-24

## 2025-01-16 RX ORDER — SODIUM CHLORIDE, SODIUM GLUCONATE, SODIUM ACETATE, POTASSIUM CHLORIDE AND MAGNESIUM CHLORIDE 30; 37; 368; 526; 502 MG/100ML; MG/100ML; MG/100ML; MG/100ML; MG/100ML
INJECTION, SOLUTION INTRAVENOUS CONTINUOUS
Status: CANCELLED | OUTPATIENT
Start: 2025-01-16

## 2025-01-16 RX ORDER — CLONAZEPAM 0.5 MG/1
0.5 TABLET ORAL 2 TIMES DAILY
COMMUNITY
Start: 2024-12-20

## 2025-01-16 RX ORDER — GABAPENTIN 100 MG/1
300 CAPSULE ORAL
Status: CANCELLED | OUTPATIENT
Start: 2025-01-16

## 2025-01-16 NOTE — LETTER
[facility] FORM 1010 - REQUEST OF AUTHORIZATION/CARRIER OR SELF INSURED EMPLOYER RESPONSE  PLEASE PRINT OR TYPE  SECTION 1. IDENTIFYING INFORMATION - To Be Filled Out By Health Care Provider    P  A Last Name:   Froy First:   Trever Middle:   [address] Address, [facility], Zip:   [address] [address]   T  I Last 4 Digits of Social Security Number:   [id number] YOB: 1975 Phone Number:    There are no phone numbers on file. Date of Injury:   01/04/2025   E  N  T Employers Name:    [employer]   Street Address, City, [facility], Zip:   Phone Number:        C  A  R  R Name:   [place] :   Mabel Barakat Claim Number (if known):   [id number]     I  [address] Address, [facility], Zip:   [address] [place] [address] Email Address:    Phone Number:   [phone] Fax Number:   [phone]   SECTION 2. REQUEST FOR AUTHORIZATION - To Be Filled Out By Health Care Provider      P Requesting Health Care Provider:   Chacorta Mcgowan M.D. Phone Number:   [phone] Fax Number:   [phone]   R  [address] Address, [facility], Zip:   [address], [address], [place] [address] Email:       V  I Diagnosis:   Left Distal Biceps tear  CPT/DRG Code:          ICD/DSM Code:   S46.212A   D  E Requested Treatment or Testing (Attach Supplement If Needed):    R Reason for Treatment or Testing (Attach Supplement If Needed):    INFORMATION REQUIRED BY RULE TO BE INCLUDED WITH REQUEST FOR AUTHORIZATION - To Be Filled Out By Health Care Provider  (Following is the required minimum information for Request of Authorization (LAC 40:2715 (C))                    []  History provided to the level of condition and as provided by Medical Treatment Schedule   P                  []  Physical Findings/Clinical Tests   R                 []  Documented functional improvements from prior treatment   O                 []  Test/imaging results   V                 []  Treatment Plan  including services being requested along with the frequency and duration   I  D  E                                                                                                                                            []     Faxed          to the [employer]/Self Insured Employer on this the      I hereby certify that this completed form and above required information was                                                           16     day of   01,               2025                                                                                                                             []      Emailed                  (day)                 (month)         (year)   R Signature of Health Care Provider:    Chacorta Mcgowan M.D. - per attached records Printed Name:    Chacorta Mcgowan M.D.  per attached records   SECTION 3. RESPONSE OF CARRIER/SELF INSURED EMPLOYER FOR AUTHORIZATION  (Check appropriate box below and return to requesting Health Care Provider, Claimant and Claimant  as provided by rule)       []  The requested Treatment or Testing is approved       []  The requested Treatment or Testing is approved with modifications (Attach summary of reasons and explanation of any modifications)       []  The requested Treatment or Testing is denied because                                       []          Not in accordance with Medical Treatment Schedule or R.S.23:1203.1(D) (Attach summary of reasons)                                       []          The request, or a portion thereof, is not related to the on-the-job injury                                       []          The claim is being denied as non-compensable                                       []          Other (Attach brief explanation)   C  A  R  R  I                                                                                                                                            []     Faxed          to the Health Care Provider (and to the   of                                                                                                                                                                             Claimant if one exists, if denied or approved with   I hereby certify that this response of  [employer]/Self Insured Employer for Authorization was                                                 modification) on this the                                                                                                                                                                                     ______  day of   _______ ,   _______                                                                                                                             []      Emailed                  (day)                 (month)         (year)   E  R Signature of Carrier/Self Insured Employer or Utilization Review Company: Printed Name:           []   The prior denied or approved with modification request is now  approved                                                                                                                                               []     Faxed          to the Health Care Provider and  of Claimant                                                                                                                                                                                                    if one exists on this the   I hereby certify that this response of  Car[employer]/Self Insured Employer for Authorization was                                      ______  day of   _______ ,   _______                                                                                                                                             []      Emailed                      (day)                 (month)         (year)    Signature of Carrier/Self Insured Employer or Utilization Review Company:  Printed Name:       SECTION 4. FIRST  REQUEST   (Form 1010A is required to be filled out by Carrier/Self Insured Employer and Health Care Provider)   C           []  The requested Treatment or Testing is delayed because minimum information required by rule was not provided   A  R  R  I                                                                                                                                            []     Faxed                 to the Health Care Provider on this the      I hereby certify that this First Request and accompanying Form 1010A was                                                       ______  day of   _______ ,   _______                                                                                                                             []      Emailed                  (day)                 (month)         (year)   E  R Signature of Carrier/Self Insured Employer or Utilization Review Company:     P  R  O  V  I  D                                                                                                                                            []     Faxed          to the Carrier/Self Insured Employer on this the                                I hereby certify that a response to the First Request and                                               accompanying Form 1010A was                                                                                 ______  day of   _______ ,   _______                                                                                                                             []      Emailed                  (day)                 (month)         (year)   E  R Signature of Health Care Provider: Printed Name:   SECTION 5. SUSPENSION OF PRIOR AUTHORIZATION DUE TO LACK OF INFORMATION       C   Suspension of Prior Authorization Process due to Lack of Information     A  R           []  The requested Treatment or Testing is delayed due to a Suspension of Prior Authorization Due to  Lack of Information   R  I  E                                                                                                                           []     Faxed                 to the Health Care Provider on this the      I hereby certify that this Suspension of Prior Authorization was                                                       ______  day of   _______ ,   _______                                                                                                                            []      Emailed                  (day)                 (month)         (year)   R Signature of Carrier/Self Insured Employer or Utilization Review Company:   Printed Name:       P    Appeal of Suspension to Medical Services Section by Health Care Provider     R  O  V  I hereby certify that this form and all information previously submitted to Car[employer]/Self Insured Employer   was faxed to "[email or web address]"  (Fax Number: [phone] this ______  day of   _______ ,   _______   I  D  E                                                                                                                           []     Faxed       to the Carrier/Self Insured Employer on this the      I hereby certify that this Appeal of Suspension of Prior Authorization was                                        ______  day of   _______ ,   _______                                                                                                                            []      Emailed                  (day)                 (month)         (year)   R Signature of Health Care Provider:   Printed Name:     SECTION 6. DETERMINATION OF MEDICAL SERVICES SECTION              []  The required information of LAC40:2715(C) was not provided              []  The required information of LAC40:2715(C) was provided   O  LALIT ELLINGTON                                                                                                                           []      Faxed           to the Health Care Provider  & Car[employer]/Self                                                                                                                                                                       Insured Employer on this the                      I hereby certify that a written determination was                                                                ______  day of   _______ ,   _______                                                                                                                            []      Emailed                  (day)                 (month)         (year)    Signature: Printed Name:     SECTION 7. HEALTH CARE PROVIDER RESPONSE TO MEDICAL SERVICES DETERMINATION   P  R  O  V  I  D                                                                                                                             []     Faxed       to the Carrier/Self Insured Employer on this the   I hereby certify that additional information, pursuant to the determination of                                       Medical Services Section, was                                    []      Emailed              ______  day of   _______ ,   _______                                                                                                                                                                     (day)                 (month)         (year)   E  R Signature of Health Care Provider: Printed Name:

## 2025-01-16 NOTE — PROGRESS NOTES
Subjective:      Patient ID: Trever Mcduffie is a 49 y.o. male.    Chief Complaint: Results (Work comp, Patient is here for MRI results of LT elbow done 1/13/25. Lt biceps injury DOI 1/4/25. Would like a refill of the tramadol if possible. )    HPI:     History of Present Illness    CHIEF COMPLAINT:  - Left biceps tendon rupture    HPI:  Trever presents with a ruptured biceps tendon in his left arm from a work-related incident. 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 He reports significant pain, particularly in the forearm area. He has functional limitations, noting inability to flex the arm. An MRI revealed a biceps tendon rupture with a six-centimeter retraction. Bruising appeared about two days after the incident. He is currently using tramadol for pain management but reports experiencing bad cramps and spasms. He has been unable to return to work due to the injury. Trever mentions a history of a previous pectoral tendon tear from bench pressing, which did not require surgery.    IMAGING:  - MRI of the left arm: Biceps tendon rupture with six cm retraction.    MEDICATIONS:  - Tramadol: Pain relief. Effective, but patient still experiences bad cramps and spasms.    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[employer]  - Job involves physical activity, including holding and grappling with patients  - Currently unable to work due to injury  - Doctor advises against working currently  - Estimated recovery period of 3-4 months before safe return to work      ROS:  ENT: +nasal congestion  Musculoskeletal: +joint pain          Past Medical History:   Diagnosis Date    Anxiety     Depression     Hypertension      Past Surgical History:   Procedure Laterality Date    HERNIA REPAIR      JOINT REPLACEMENT      right index finger  Right      Social History     Socioeconomic History    Marital status:    Tobacco Use    Smoking status:  "Every Day     Current packs/day: 0.00     Average packs/day: 0.3 packs/day for 3.2 years (0.8 ttl pk-yrs)     Types: Vaping with nicotine, Cigarettes     Start date: 2021     Last attempt to quit: 2025     Years since quittin.0    Smokeless tobacco: Never   Substance and Sexual Activity    Alcohol use: Never    Drug use: Never    Sexual activity: Not Currently     Partners: Female     Birth control/protection: Abstinence         Current Outpatient Medications:     olmesartan (BENICAR) 20 MG tablet, TAKE 1 TABLET(20 MG) BY MOUTH DAILY, Disp: 30 tablet, Rfl: 0    traMADoL (ULTRAM) 50 mg tablet, Take 1 tablet (50 mg total) by mouth every 8 (eight) hours as needed for Pain., Disp: 21 each, Rfl: 0  Review of patient's allergies indicates:   Allergen Reactions    Meperidine Hives       BP (!) 140/95   Pulse 107   Ht 5' 6" (1.676 m)   Wt 98.5 kg (217 lb 2.5 oz)   BMI 35.05 kg/m²     Comprehensive review of systems completed and negative except as per HPI.        Objective:   General: Well-developed, well-nourished.  Neuro: Alert and oriented x 3.  Psych: Normal mood and affect.  Card: Regular rate and rhythm  Resp: Respirations regular and unlabored    Elbow Exam:    No obvious deformity. Range of motion is 0 to 130 degrees. Negative varus and valgus stress test. Supination and pronation to 90 degrees. Negative tenderness to palpation over the lateral epicondyle. Negative tenderness to palpation over the medial epicondyle. Negative Tinel´s test. No olecranon tenderness. 3/5 strength, normal skin appearance and palpable pulses distally. Sensibility normal.      Assessment:         1. Rupture of left distal biceps tendon, initial encounter    2. Encounter related to worker's compensation claim          Plan:       Orders Placed This Encounter    CBC auto differential    Comprehensive metabolic panel    Ambulatory Referral/Consult to Physical Therapy/Occupational Therapy    EKG 12-lead    Case Request " Operating Room: REPAIR, TENDON, BICEPS, DISTAL        Imaging and exam findings discussed.     Assessment & Plan    PROCEDURES:  - Trever understands the risks and benefits and elects to proceed with surgery to repair the ruptured biceps tendon. Scheduled for January 27th.  - Potential complications: infection, structural damage (unlikely), forearm sensitivity due to nerve manipulation during surgery.  - Outpatient procedure under general anesthesia.    MEDICATIONS:  - Tramadol for pain.  - Muscle relaxer.    PATIENT INSTRUCTIONS:  - Avoid work activities, especially grappling with patients, for a minimum of 3 months after surgery.  - Do not return to work for at least 6-8 weeks, even with modified duties that exclude patient interaction.               All questions were answered. Patient happy and in agreement with the plan.     This note was generated with the assistance of ambient listening technology. Verbal consent was obtained by the patient and accompanying visitor(s) for the recording of patient appointment to facilitate this note. I attest to having reviewed and edited the generated note for accuracy, though some syntax or spelling errors may persist. Please contact the author of this note for any clarification.

## 2025-01-16 NOTE — H&P (VIEW-ONLY)
Subjective:      Patient ID: Trever Mcduffie is a 49 y.o. male.    Chief Complaint: Results (Work comp, Patient is here for MRI results of LT elbow done 1/13/25. Lt biceps injury DOI 1/4/25. Would like a refill of the tramadol if possible. )    HPI:     History of Present Illness    CHIEF COMPLAINT:  - Left biceps tendon rupture    HPI:  Trever presents with a ruptured biceps tendon in his left arm from a work-related incident. 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 He reports significant pain, particularly in the forearm area. He has functional limitations, noting inability to flex the arm. An MRI revealed a biceps tendon rupture with a six-centimeter retraction. Bruising appeared about two days after the incident. He is currently using tramadol for pain management but reports experiencing bad cramps and spasms. He has been unable to return to work due to the injury. Trever mentions a history of a previous pectoral tendon tear from bench pressing, which did not require surgery.    IMAGING:  - MRI of the left arm: Biceps tendon rupture with six cm retraction.    MEDICATIONS:  - Tramadol: Pain relief. Effective, but patient still experiences bad cramps and spasms.    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[employer]  - Job involves physical activity, including holding and grappling with patients  - Currently unable to work due to injury  - Doctor advises against working currently  - Estimated recovery period of 3-4 months before safe return to work      ROS:  ENT: +nasal congestion  Musculoskeletal: +joint pain          Past Medical History:   Diagnosis Date    Anxiety     Depression     Hypertension      Past Surgical History:   Procedure Laterality Date    HERNIA REPAIR      JOINT REPLACEMENT      right index finger  Right      Social History     Socioeconomic History    Marital status:    Tobacco Use    Smoking status:  "Every Day     Current packs/day: 0.00     Average packs/day: 0.3 packs/day for 3.2 years (0.8 ttl pk-yrs)     Types: Vaping with nicotine, Cigarettes     Start date: 2021     Last attempt to quit: 2025     Years since quittin.0    Smokeless tobacco: Never   Substance and Sexual Activity    Alcohol use: Never    Drug use: Never    Sexual activity: Not Currently     Partners: Female     Birth control/protection: Abstinence         Current Outpatient Medications:     olmesartan (BENICAR) 20 MG tablet, TAKE 1 TABLET(20 MG) BY MOUTH DAILY, Disp: 30 tablet, Rfl: 0    traMADoL (ULTRAM) 50 mg tablet, Take 1 tablet (50 mg total) by mouth every 8 (eight) hours as needed for Pain., Disp: 21 each, Rfl: 0  Review of patient's allergies indicates:   Allergen Reactions    Meperidine Hives       BP (!) 140/95   Pulse 107   Ht 5' 6" (1.676 m)   Wt 98.5 kg (217 lb 2.5 oz)   BMI 35.05 kg/m²     Comprehensive review of systems completed and negative except as per HPI.        Objective:   General: Well-developed, well-nourished.  Neuro: Alert and oriented x 3.  Psych: Normal mood and affect.  Card: Regular rate and rhythm  Resp: Respirations regular and unlabored    Elbow Exam:    No obvious deformity. Range of motion is 0 to 130 degrees. Negative varus and valgus stress test. Supination and pronation to 90 degrees. Negative tenderness to palpation over the lateral epicondyle. Negative tenderness to palpation over the medial epicondyle. Negative Tinel´s test. No olecranon tenderness. 3/5 strength, normal skin appearance and palpable pulses distally. Sensibility normal.      Assessment:         1. Rupture of left distal biceps tendon, initial encounter    2. Encounter related to worker's compensation claim          Plan:       Orders Placed This Encounter    CBC auto differential    Comprehensive metabolic panel    Ambulatory Referral/Consult to Physical Therapy/Occupational Therapy    EKG 12-lead    Case Request " Operating Room: REPAIR, TENDON, BICEPS, DISTAL        Imaging and exam findings discussed.     Assessment & Plan    PROCEDURES:  - Trever understands the risks and benefits and elects to proceed with surgery to repair the ruptured biceps tendon. Scheduled for January 27th.  - Potential complications: infection, structural damage (unlikely), forearm sensitivity due to nerve manipulation during surgery.  - Outpatient procedure under general anesthesia.    MEDICATIONS:  - Tramadol for pain.  - Muscle relaxer.    PATIENT INSTRUCTIONS:  - Avoid work activities, especially grappling with patients, for a minimum of 3 months after surgery.  - Do not return to work for at least 6-8 weeks, even with modified duties that exclude patient interaction.               All questions were answered. Patient happy and in agreement with the plan.     This note was generated with the assistance of ambient listening technology. Verbal consent was obtained by the patient and accompanying visitor(s) for the recording of patient appointment to facilitate this note. I attest to having reviewed and edited the generated note for accuracy, though some syntax or spelling errors may persist. Please contact the author of this note for any clarification.

## 2025-01-24 ENCOUNTER — ANESTHESIA EVENT (OUTPATIENT)
Dept: SURGERY | Facility: HOSPITAL | Age: 50
End: 2025-01-24
Payer: OTHER MISCELLANEOUS

## 2025-01-24 ENCOUNTER — CLINICAL SUPPORT (OUTPATIENT)
Dept: LAB | Facility: HOSPITAL | Age: 50
End: 2025-01-24
Attending: ORTHOPAEDIC SURGERY
Payer: OTHER MISCELLANEOUS

## 2025-01-24 DIAGNOSIS — Z02.6 ENCOUNTER RELATED TO WORKER'S COMPENSATION CLAIM: ICD-10-CM

## 2025-01-24 DIAGNOSIS — S46.212A RUPTURE OF LEFT DISTAL BICEPS TENDON, INITIAL ENCOUNTER: ICD-10-CM

## 2025-01-24 DIAGNOSIS — I10 HYPERTENSION, UNSPECIFIED TYPE: ICD-10-CM

## 2025-01-24 PROCEDURE — 93005 ELECTROCARDIOGRAM TRACING: CPT

## 2025-01-24 RX ORDER — TRAMADOL HYDROCHLORIDE 50 MG/1
50 TABLET ORAL EVERY 8 HOURS PRN
Qty: 12 TABLET | Refills: 0 | Status: ON HOLD | OUTPATIENT
Start: 2025-01-24 | End: 2025-01-27 | Stop reason: HOSPADM

## 2025-01-25 LAB
OHS QRS DURATION: 102 MS
OHS QTC CALCULATION: 429 MS

## 2025-01-27 ENCOUNTER — HOSPITAL ENCOUNTER (OUTPATIENT)
Facility: HOSPITAL | Age: 50
Discharge: HOME | End: 2025-01-27
Attending: ORTHOPAEDIC SURGERY | Admitting: ORTHOPAEDIC SURGERY
Payer: OTHER MISCELLANEOUS

## 2025-01-27 ENCOUNTER — ANESTHESIA (OUTPATIENT)
Dept: SURGERY | Facility: HOSPITAL | Age: 50
End: 2025-01-27
Payer: OTHER MISCELLANEOUS

## 2025-01-27 DIAGNOSIS — Z02.6 ENCOUNTER RELATED TO WORKER'S COMPENSATION CLAIM: ICD-10-CM

## 2025-01-27 DIAGNOSIS — I10 HYPERTENSION, UNSPECIFIED TYPE: ICD-10-CM

## 2025-01-27 DIAGNOSIS — S46.212A RUPTURE OF LEFT DISTAL BICEPS TENDON, INITIAL ENCOUNTER: Primary | ICD-10-CM

## 2025-01-27 DIAGNOSIS — S46.212S RUPTURE OF LEFT DISTAL BICEPS TENDON, SEQUELA: ICD-10-CM

## 2025-01-27 DIAGNOSIS — Z98.890 STATUS POST TENDON REPAIR: Primary | ICD-10-CM

## 2025-01-27 PROCEDURE — D9220A PRA ANESTHESIA: Mod: ,,, | Performed by: ANESTHESIOLOGY

## 2025-01-27 PROCEDURE — 63600175 PHARM REV CODE 636 W HCPCS

## 2025-01-27 PROCEDURE — 37000008 HC ANESTHESIA 1ST 15 MINUTES: Performed by: ORTHOPAEDIC SURGERY

## 2025-01-27 PROCEDURE — 36000709 HC OR TIME LEV III EA ADD 15 MIN: Performed by: ORTHOPAEDIC SURGERY

## 2025-01-27 PROCEDURE — 24342 REPAIR OF RUPTURED TENDON: CPT | Mod: LT,,, | Performed by: ORTHOPAEDIC SURGERY

## 2025-01-27 PROCEDURE — 36000708 HC OR TIME LEV III 1ST 15 MIN: Performed by: ORTHOPAEDIC SURGERY

## 2025-01-27 PROCEDURE — 37000009 HC ANESTHESIA EA ADD 15 MINS: Performed by: ORTHOPAEDIC SURGERY

## 2025-01-27 PROCEDURE — 64415 NJX AA&/STRD BRCH PLXS IMG: CPT | Performed by: ANESTHESIOLOGY

## 2025-01-27 PROCEDURE — C1713 ANCHOR/SCREW BN/BN,TIS/BN: HCPCS | Performed by: ORTHOPAEDIC SURGERY

## 2025-01-27 PROCEDURE — 25000003 PHARM REV CODE 250

## 2025-01-27 PROCEDURE — 71000015 HC POSTOP RECOV 1ST HR: Performed by: ORTHOPAEDIC SURGERY

## 2025-01-27 PROCEDURE — 63600175 PHARM REV CODE 636 W HCPCS: Performed by: ANESTHESIOLOGY

## 2025-01-27 PROCEDURE — 27201423 OPTIME MED/SURG SUP & DEVICES STERILE SUPPLY: Performed by: ORTHOPAEDIC SURGERY

## 2025-01-27 PROCEDURE — 71000033 HC RECOVERY, INTIAL HOUR: Performed by: ORTHOPAEDIC SURGERY

## 2025-01-27 PROCEDURE — 71000016 HC POSTOP RECOV ADDL HR: Performed by: ORTHOPAEDIC SURGERY

## 2025-01-27 PROCEDURE — 24342 REPAIR OF RUPTURED TENDON: CPT | Mod: AS,LT,,

## 2025-01-27 DEVICE — IMPL DELIVERY SYS,DISTAL BICEPS, BC
Type: IMPLANTABLE DEVICE | Site: ARM | Status: FUNCTIONAL
Brand: ARTHREX®

## 2025-01-27 RX ORDER — ACETAMINOPHEN 500 MG
1000 TABLET ORAL
Status: COMPLETED | OUTPATIENT
Start: 2025-01-27 | End: 2025-01-27

## 2025-01-27 RX ORDER — ALUMINUM HYDROXIDE, MAGNESIUM HYDROXIDE, AND SIMETHICONE 1200; 120; 1200 MG/30ML; MG/30ML; MG/30ML
30 SUSPENSION ORAL EVERY 6 HOURS PRN
Status: DISCONTINUED | OUTPATIENT
Start: 2025-01-27 | End: 2025-01-27 | Stop reason: HOSPADM

## 2025-01-27 RX ORDER — LIDOCAINE HYDROCHLORIDE 10 MG/ML
1 INJECTION, SOLUTION EPIDURAL; INFILTRATION; INTRACAUDAL; PERINEURAL ONCE
OUTPATIENT
Start: 2025-01-27 | End: 2025-01-27

## 2025-01-27 RX ORDER — HYDROCODONE BITARTRATE AND ACETAMINOPHEN 5; 325 MG/1; MG/1
1 TABLET ORAL EVERY 4 HOURS PRN
Status: DISCONTINUED | OUTPATIENT
Start: 2025-01-27 | End: 2025-01-27 | Stop reason: HOSPADM

## 2025-01-27 RX ORDER — CEFAZOLIN 2 G/1
2 INJECTION, POWDER, FOR SOLUTION INTRAMUSCULAR; INTRAVENOUS
Status: COMPLETED | OUTPATIENT
Start: 2025-01-27 | End: 2025-01-27

## 2025-01-27 RX ORDER — FENTANYL CITRATE 50 UG/ML
INJECTION, SOLUTION INTRAMUSCULAR; INTRAVENOUS
Status: DISCONTINUED | OUTPATIENT
Start: 2025-01-27 | End: 2025-01-27

## 2025-01-27 RX ORDER — ONDANSETRON 4 MG/1
4 TABLET, ORALLY DISINTEGRATING ORAL EVERY 6 HOURS PRN
Qty: 30 TABLET | Refills: 0 | Status: SHIPPED | OUTPATIENT
Start: 2025-01-27 | End: 2025-02-06

## 2025-01-27 RX ORDER — HYDROMORPHONE HYDROCHLORIDE 2 MG/ML
0.2 INJECTION, SOLUTION INTRAMUSCULAR; INTRAVENOUS; SUBCUTANEOUS EVERY 5 MIN PRN
Status: DISCONTINUED | OUTPATIENT
Start: 2025-01-27 | End: 2025-01-27 | Stop reason: HOSPADM

## 2025-01-27 RX ORDER — ONDANSETRON HYDROCHLORIDE 2 MG/ML
INJECTION, SOLUTION INTRAVENOUS
Status: DISCONTINUED | OUTPATIENT
Start: 2025-01-27 | End: 2025-01-27

## 2025-01-27 RX ORDER — ROPIVACAINE HYDROCHLORIDE 5 MG/ML
INJECTION, SOLUTION EPIDURAL; INFILTRATION; PERINEURAL
Status: COMPLETED | OUTPATIENT
Start: 2025-01-27 | End: 2025-01-27

## 2025-01-27 RX ORDER — OLMESARTAN MEDOXOMIL 20 MG/1
20 TABLET ORAL DAILY
Qty: 90 TABLET | Refills: 0 | Status: SHIPPED | OUTPATIENT
Start: 2025-01-27

## 2025-01-27 RX ORDER — KETOROLAC TROMETHAMINE 10 MG/1
10 TABLET, FILM COATED ORAL ONCE
Status: COMPLETED | OUTPATIENT
Start: 2025-01-27 | End: 2025-01-27

## 2025-01-27 RX ORDER — GLYCOPYRROLATE 0.2 MG/ML
INJECTION INTRAMUSCULAR; INTRAVENOUS
Status: DISCONTINUED | OUTPATIENT
Start: 2025-01-27 | End: 2025-01-27

## 2025-01-27 RX ORDER — GABAPENTIN 300 MG/1
300 CAPSULE ORAL EVERY 8 HOURS
Qty: 15 CAPSULE | Refills: 0 | Status: SHIPPED | OUTPATIENT
Start: 2025-01-27 | End: 2025-02-01

## 2025-01-27 RX ORDER — METHOCARBAMOL 500 MG/1
1000 TABLET, FILM COATED ORAL EVERY 6 HOURS PRN
Status: DISCONTINUED | OUTPATIENT
Start: 2025-01-27 | End: 2025-01-27 | Stop reason: HOSPADM

## 2025-01-27 RX ORDER — ONDANSETRON HYDROCHLORIDE 2 MG/ML
4 INJECTION, SOLUTION INTRAVENOUS EVERY 6 HOURS PRN
Status: DISCONTINUED | OUTPATIENT
Start: 2025-01-27 | End: 2025-01-27 | Stop reason: HOSPADM

## 2025-01-27 RX ORDER — ONDANSETRON HYDROCHLORIDE 2 MG/ML
4 INJECTION, SOLUTION INTRAVENOUS DAILY PRN
Status: DISCONTINUED | OUTPATIENT
Start: 2025-01-27 | End: 2025-01-27 | Stop reason: HOSPADM

## 2025-01-27 RX ORDER — CALCIUM CHLORIDE INJECTION 100 MG/ML
INJECTION, SOLUTION INTRAVENOUS
Status: DISCONTINUED | OUTPATIENT
Start: 2025-01-27 | End: 2025-01-27

## 2025-01-27 RX ORDER — LIDOCAINE HYDROCHLORIDE 20 MG/ML
INJECTION INTRAVENOUS
Status: DISCONTINUED | OUTPATIENT
Start: 2025-01-27 | End: 2025-01-27

## 2025-01-27 RX ORDER — EPHEDRINE SULFATE 50 MG/ML
INJECTION, SOLUTION INTRAVENOUS
Status: DISCONTINUED | OUTPATIENT
Start: 2025-01-27 | End: 2025-01-27

## 2025-01-27 RX ORDER — OXYCODONE HYDROCHLORIDE 5 MG/1
5 TABLET ORAL EVERY 12 HOURS PRN
Qty: 14 TABLET | Refills: 0 | Status: SHIPPED | OUTPATIENT
Start: 2025-01-27 | End: 2025-02-03

## 2025-01-27 RX ORDER — METHOCARBAMOL 750 MG/1
750 TABLET, FILM COATED ORAL EVERY 6 HOURS
Qty: 56 TABLET | Refills: 0 | Status: SHIPPED | OUTPATIENT
Start: 2025-01-27 | End: 2025-02-10

## 2025-01-27 RX ORDER — SODIUM CHLORIDE, SODIUM GLUCONATE, SODIUM ACETATE, POTASSIUM CHLORIDE AND MAGNESIUM CHLORIDE 30; 37; 368; 526; 502 MG/100ML; MG/100ML; MG/100ML; MG/100ML; MG/100ML
INJECTION, SOLUTION INTRAVENOUS CONTINUOUS
OUTPATIENT
Start: 2025-01-27 | End: 2025-02-26

## 2025-01-27 RX ORDER — GLUCAGON 1 MG
1 KIT INJECTION
Status: DISCONTINUED | OUTPATIENT
Start: 2025-01-27 | End: 2025-01-27 | Stop reason: HOSPADM

## 2025-01-27 RX ORDER — OLMESARTAN MEDOXOMIL 20 MG/1
TABLET ORAL
Qty: 30 TABLET | Refills: 1 | Status: SHIPPED | OUTPATIENT
Start: 2025-01-27 | End: 2025-01-27 | Stop reason: SDUPTHER

## 2025-01-27 RX ORDER — METOCLOPRAMIDE HYDROCHLORIDE 5 MG/ML
10 INJECTION INTRAMUSCULAR; INTRAVENOUS EVERY 6 HOURS PRN
Status: DISCONTINUED | OUTPATIENT
Start: 2025-01-27 | End: 2025-01-27 | Stop reason: HOSPADM

## 2025-01-27 RX ORDER — ACETAMINOPHEN 10 MG/ML
1000 INJECTION, SOLUTION INTRAVENOUS ONCE
Status: DISCONTINUED | OUTPATIENT
Start: 2025-01-27 | End: 2025-01-27 | Stop reason: HOSPADM

## 2025-01-27 RX ORDER — ROPIVACAINE HYDROCHLORIDE 5 MG/ML
INJECTION, SOLUTION EPIDURAL; INFILTRATION; PERINEURAL
Status: COMPLETED
Start: 2025-01-27 | End: 2025-01-27

## 2025-01-27 RX ORDER — PHENYLEPHRINE HCL IN 0.9% NACL 1 MG/10 ML
SYRINGE (ML) INTRAVENOUS
Status: DISCONTINUED | OUTPATIENT
Start: 2025-01-27 | End: 2025-01-27

## 2025-01-27 RX ORDER — MIDAZOLAM HYDROCHLORIDE 2 MG/2ML
2 INJECTION, SOLUTION INTRAMUSCULAR; INTRAVENOUS ONCE AS NEEDED
OUTPATIENT
Start: 2025-01-27 | End: 2036-06-25

## 2025-01-27 RX ORDER — MIDAZOLAM HYDROCHLORIDE 2 MG/2ML
INJECTION, SOLUTION INTRAMUSCULAR; INTRAVENOUS
Status: COMPLETED
Start: 2025-01-27 | End: 2025-01-27

## 2025-01-27 RX ORDER — MELOXICAM 7.5 MG/1
TABLET ORAL
Qty: 30 TABLET | Refills: 0 | Status: SHIPPED | OUTPATIENT
Start: 2025-02-02 | End: 2025-02-11

## 2025-01-27 RX ORDER — ROCURONIUM BROMIDE 10 MG/ML
INJECTION, SOLUTION INTRAVENOUS
Status: DISCONTINUED | OUTPATIENT
Start: 2025-01-27 | End: 2025-01-27

## 2025-01-27 RX ORDER — GABAPENTIN 300 MG/1
300 CAPSULE ORAL
Status: COMPLETED | OUTPATIENT
Start: 2025-01-27 | End: 2025-01-27

## 2025-01-27 RX ORDER — KETOROLAC TROMETHAMINE 10 MG/1
10 TABLET, FILM COATED ORAL EVERY 6 HOURS
Qty: 20 TABLET | Refills: 0 | Status: SHIPPED | OUTPATIENT
Start: 2025-01-27 | End: 2025-02-01

## 2025-01-27 RX ORDER — ESMOLOL HYDROCHLORIDE 10 MG/ML
INJECTION INTRAVENOUS
Status: DISCONTINUED | OUTPATIENT
Start: 2025-01-27 | End: 2025-01-27

## 2025-01-27 RX ORDER — PROPOFOL 10 MG/ML
VIAL (ML) INTRAVENOUS
Status: DISCONTINUED | OUTPATIENT
Start: 2025-01-27 | End: 2025-01-27

## 2025-01-27 RX ORDER — DIPHENHYDRAMINE HYDROCHLORIDE 50 MG/ML
25 INJECTION INTRAMUSCULAR; INTRAVENOUS EVERY 6 HOURS PRN
Status: DISCONTINUED | OUTPATIENT
Start: 2025-01-27 | End: 2025-01-27 | Stop reason: HOSPADM

## 2025-01-27 RX ORDER — SODIUM CHLORIDE 9 MG/ML
INJECTION, SOLUTION INTRAVENOUS CONTINUOUS
Status: DISCONTINUED | OUTPATIENT
Start: 2025-01-27 | End: 2025-01-27 | Stop reason: HOSPADM

## 2025-01-27 RX ORDER — SODIUM CHLORIDE, SODIUM GLUCONATE, SODIUM ACETATE, POTASSIUM CHLORIDE AND MAGNESIUM CHLORIDE 30; 37; 368; 526; 502 MG/100ML; MG/100ML; MG/100ML; MG/100ML; MG/100ML
INJECTION, SOLUTION INTRAVENOUS CONTINUOUS
Status: DISCONTINUED | OUTPATIENT
Start: 2025-01-27 | End: 2025-01-27 | Stop reason: HOSPADM

## 2025-01-27 RX ORDER — DEXAMETHASONE SODIUM PHOSPHATE 4 MG/ML
INJECTION, SOLUTION INTRA-ARTICULAR; INTRALESIONAL; INTRAMUSCULAR; INTRAVENOUS; SOFT TISSUE
Status: DISCONTINUED | OUTPATIENT
Start: 2025-01-27 | End: 2025-01-27

## 2025-01-27 RX ORDER — MORPHINE SULFATE 4 MG/ML
3 INJECTION, SOLUTION INTRAMUSCULAR; INTRAVENOUS
Status: DISCONTINUED | OUTPATIENT
Start: 2025-01-27 | End: 2025-01-27 | Stop reason: HOSPADM

## 2025-01-27 RX ADMIN — ROCURONIUM BROMIDE 50 MG: 10 SOLUTION INTRAVENOUS at 10:01

## 2025-01-27 RX ADMIN — SUGAMMADEX 200 MG: 100 INJECTION, SOLUTION INTRAVENOUS at 11:01

## 2025-01-27 RX ADMIN — ROPIVACAINE HYDROCHLORIDE 30 ML: 5 INJECTION, SOLUTION EPIDURAL; INFILTRATION; PERINEURAL at 09:01

## 2025-01-27 RX ADMIN — ONDANSETRON HYDROCHLORIDE 4 MG: 2 SOLUTION INTRAMUSCULAR; INTRAVENOUS at 10:01

## 2025-01-27 RX ADMIN — KETOROLAC TROMETHAMINE 10 MG: 10 TABLET, FILM COATED ORAL at 08:01

## 2025-01-27 RX ADMIN — Medication 100 MCG: at 10:01

## 2025-01-27 RX ADMIN — PROPOFOL 170 MG: 10 INJECTION, EMULSION INTRAVENOUS at 10:01

## 2025-01-27 RX ADMIN — GABAPENTIN 300 MG: 300 CAPSULE ORAL at 08:01

## 2025-01-27 RX ADMIN — MIDAZOLAM HYDROCHLORIDE 2 MG: 1 INJECTION, SOLUTION INTRAMUSCULAR; INTRAVENOUS at 09:01

## 2025-01-27 RX ADMIN — EPHEDRINE SULFATE 15 MG: 50 INJECTION, SOLUTION INTRAVENOUS at 10:01

## 2025-01-27 RX ADMIN — LIDOCAINE HYDROCHLORIDE 80 MG: 20 INJECTION INTRAVENOUS at 10:01

## 2025-01-27 RX ADMIN — FENTANYL CITRATE 100 MCG: 50 INJECTION, SOLUTION INTRAMUSCULAR; INTRAVENOUS at 10:01

## 2025-01-27 RX ADMIN — ESMOLOL HYDROCHLORIDE 10 MG: 100 INJECTION, SOLUTION INTRAVENOUS at 11:01

## 2025-01-27 RX ADMIN — CEFAZOLIN 2 G: 2 INJECTION, POWDER, FOR SOLUTION INTRAMUSCULAR; INTRAVENOUS at 10:01

## 2025-01-27 RX ADMIN — CALCIUM CHLORIDE INJECTION 0.5 G: 100 INJECTION, SOLUTION INTRAVENOUS at 11:01

## 2025-01-27 RX ADMIN — EPHEDRINE SULFATE 25 MG: 50 INJECTION INTRAVENOUS at 10:01

## 2025-01-27 RX ADMIN — GLYCOPYRROLATE 0.2 MG: 0.2 INJECTION INTRAMUSCULAR; INTRAVENOUS at 10:01

## 2025-01-27 RX ADMIN — EPHEDRINE SULFATE 10 MG: 50 INJECTION, SOLUTION INTRAVENOUS at 10:01

## 2025-01-27 RX ADMIN — ACETAMINOPHEN 1000 MG: 500 TABLET ORAL at 08:01

## 2025-01-27 RX ADMIN — SODIUM CHLORIDE, SODIUM GLUCONATE, SODIUM ACETATE, POTASSIUM CHLORIDE AND MAGNESIUM CHLORIDE: 526; 502; 368; 37; 30 INJECTION, SOLUTION INTRAVENOUS at 10:01

## 2025-01-27 RX ADMIN — DEXAMETHASONE SODIUM PHOSPHATE 8 MG: 4 INJECTION, SOLUTION INTRA-ARTICULAR; INTRALESIONAL; INTRAMUSCULAR; INTRAVENOUS; SOFT TISSUE at 10:01

## 2025-01-27 RX ADMIN — CALCIUM CHLORIDE INJECTION 0.5 G: 100 INJECTION, SOLUTION INTRAVENOUS at 10:01

## 2025-01-27 NOTE — ANESTHESIA PROCEDURE NOTES
Intubation    Date/Time: 1/27/2025 10:20 AM    Performed by: Jose Archibald CRNA  Authorized by: Pranay Harmon MD    Intubation:     Induction:  Intravenous    Intubated:  Postinduction    Mask Ventilation:  Easy with oral airway    Attempts:  1    Attempted By:  CRNA    Method of Intubation:  Direct    Blade:  Phillips 2    Laryngeal View Grade: Grade I - full view of cords      Difficult Airway Encountered?: No      Complications:  None    Airway Device:  Oral endotracheal tube    Airway Device Size:  7.5    Style/Cuff Inflation:  Cuffed (inflated to minimal occlusive pressure)    Inflation Amount (mL):  7    Tube secured:  23    Secured at:  The lips    Placement Verified By:  Capnometry    Complicating Factors:  None    Findings Post-Intubation:  BS equal bilateral and atraumatic/condition of teeth unchanged

## 2025-01-27 NOTE — ANESTHESIA PREPROCEDURE EVALUATION
01/27/2025  Trever Mcduffie is a 49 y.o., male with bicep tendon tear on left for repair.  He is in good general health otherwise, denies cardiopulmonary complaints.      Pre-op Assessment    I have reviewed the Patient Summary Reports.     I have reviewed the Nursing Notes. I have reviewed the NPO Status.   I have reviewed the Medications.     Review of Systems  Anesthesia Hx:  No problems with previous Anesthesia             Denies Family Hx of Anesthesia complications.    Denies Personal Hx of Anesthesia complications.                    Cardiovascular:     Hypertension       Denies Angina.                                    Pulmonary:  Pulmonary Normal      Denies Shortness of breath.                  Endocrine:        Obesity / BMI > 30  Psych:  Psychiatric History                  Physical Exam  General: Well nourished, Cooperative, Alert and Oriented    Airway:  Mallampati: II   Mouth Opening: Normal  TM Distance: Normal  Tongue: Normal  Neck ROM: Normal ROM    Dental:  Intact    Chest/Lungs:  Normal Respiratory Rate    Heart:  Rate: Normal  Rhythm: Regular Rhythm        Anesthesia Plan  Type of Anesthesia, risks & benefits discussed:    Anesthesia Type: Gen ETT  Intra-op Monitoring Plan: Standard ASA Monitors  Post Op Pain Control Plan: multimodal analgesia, IV/PO Opioids PRN and peripheral nerve block  Induction:  IV  Airway Plan: Direct, Post-Induction  Informed Consent: Informed consent signed with the Patient and all parties understand the risks and agree with anesthesia plan.  All questions answered.   ASA Score: 2  Day of Surgery Review of History & Physical: H&P Update referred to the surgeon/provider.    Ready For Surgery From Anesthesia Perspective.     .

## 2025-01-27 NOTE — ANESTHESIA POSTPROCEDURE EVALUATION
Anesthesia Post Evaluation    Patient: Trever Mcduffie    Procedure(s) Performed: Procedure(s) (LRB):  REPAIR, TENDON, BICEPS, DISTAL (Left)    Final Anesthesia Type: general      Patient location during evaluation: PACU  Patient participation: Yes- Able to Participate  Level of consciousness: awake and alert  Post-procedure vital signs: reviewed and stable  Pain management: adequate  Airway patency: patent      Anesthetic complications: no      Cardiovascular status: blood pressure returned to baseline  Respiratory status: unassisted  Hydration status: euvolemic  Follow-up not needed.              Vitals Value Taken Time   /80 01/27/25 1330   Temp 36.4 °C (97.5 °F) 01/27/25 1330   Pulse 96 01/27/25 1330   Resp 18 01/27/25 1330   SpO2 85 % 01/27/25 1330   Vitals shown include unfiled device data.      Event Time   Out of Recovery 12:30:00         Pain/Jovita Score: Pain Rating Prior to Med Admin: 3 (1/27/2025  8:28 AM)  Jovita Score: 10 (1/27/2025 12:32 PM)  Modified Jovita Score: 20 (1/27/2025  1:30 PM)

## 2025-01-27 NOTE — ANESTHESIA PROCEDURE NOTES
Peripheral Block    Patient location during procedure: pre-op   Block not for primary anesthetic.  Reason for block: at surgeon's request and post-op pain management   Post-op Pain Location: left arm   Start time: 1/27/2025 9:34 AM  Timeout: 1/27/2025 9:32 AM   End time: 1/27/2025 9:36 AM    Staffing  Authorizing Provider: Pranay Harmon MD  Performing Provider: Pranay Harmon MD    Staffing  Performed by: Pranay Harmon MD  Authorized by: Pranay Harmon MD    Preanesthetic Checklist  Completed: patient identified, IV checked, site marked, risks and benefits discussed, surgical consent, monitors and equipment checked, pre-op evaluation and timeout performed  Peripheral Block  Patient position: supine  Prep: ChloraPrep  Patient monitoring: heart rate, cardiac monitor, continuous pulse ox, continuous capnometry and frequent blood pressure checks  Block type: supraclavicular  Laterality: left  Injection technique: single shot  Needle  Needle type: Stimuplex   Needle gauge: 22 G  Needle length: 2 in  Needle localization: anatomical landmarks and ultrasound guidance   -ultrasound image captured on disc.  Assessment  Injection assessment: negative aspiration, negative parasthesia and local visualized surrounding nerve  Paresthesia pain: none  Heart rate change: no  Slow fractionated injection: yes    Medications:    Medications: ropivacaine (NAROPIN) injection 0.5% - Perineural   30 mL - 1/27/2025 9:36:00 AM    Additional Notes  VSS.  [facility] RN monitoring vitals throughout procedure.  Patient tolerated procedure well.

## 2025-01-27 NOTE — TRANSFER OF CARE
"Anesthesia Transfer of Care Note    Patient: Trever Mcduffie    Procedure(s) Performed: Procedure(s) (LRB):  REPAIR, TENDON, BICEPS, DISTAL (Left)    Patient location: PACU    Anesthesia Type: general    Transport from OR: Transported from OR on room air with adequate spontaneous ventilation. Upon arrival to PACU/ICU, patient attached to 100% O2 by T-piece with adequate spontaneous ventilation    Post pain: adequate analgesia    Post assessment: no apparent anesthetic complications    Post vital signs: stable    Level of consciousness: sedated and responds to stimulation    Nausea/Vomiting: no nausea/vomiting    Complications: none    Transfer of care protocol was followed      Last vitals: Visit Vitals  BP (!) 102/56   Pulse 78   Temp 36 °C (96.8 °F)   Resp 10   Ht 5' 7" (1.702 m)   Wt 96.8 kg (213 lb 6.5 oz)   SpO2 98%   BMI 33.42 kg/m²     "

## 2025-01-27 NOTE — OP NOTE
01/27/2025    PRIMARY SURGEON: Chacorta Mcgowan MD    ASSISTANT:  Stephanie Barbosa NP was imperative to the critical parts of the surgical case.  This included the surgical approach and dissection, retraction, placement of hardware and implants, and closure.    PREOPERATIVE DIAGNOSIS:  Left distal biceps rupture     POSTOPERATIVE DIAGNOSIS:  Same    PROCEDURES:  Left distal biceps repair     ANESTHESIA:  General    BLOOD LOSS:  Less than 10 mL    DVT PROPHYLAXIS:  Aspirin daily for 4 weeks    OUTCOME:  Patient tolerated treatment/procedure well without complications and is now ready for discharge.    DISPOSITION: Home/SelfCare    FOLLOW UP: In clinic    INSTRUMENTATION:  Arthrex distal biceps button and interference screw  Implant Name Type Inv. Item Serial No.  Lot No. LRB No. Used Action   SYS IMPL DEL DISTAL BICEPS BC - [id number]  SYS IMPL DEL DISTAL BICEPS BC  AR[employer]X [id number] Left 1 Implanted       PROCEDURE IN DETAIL:    Distal biceps repair    The patient was placed in a supine position. The operative site was prepped and draped in the normal sterile fashion. A time out was performed to confirm correct operative extremity, allergies, and antibiotic infusion.    A tranverse incision was made 4cm distal to elbow crease. Then careful dissect out the lateral antecubital cutaneous nerve. Place vessel loop around nerve and retract out of surgical field.    I tied off vessels to find the radial tuberosity. Dissect between the pronator teres and the brachioradialis.  I was able to visualize the retracted and torn tendon.    Tendon was prepared by removing most distal end with knife. Also will need to reduce size of tendon at distal end to fit through 8mm reamed tunnel. Then use non-absorbable suture through the last 25mm of tendon.    Then prepare the radial tuberosity. Fully supinate the arm. First drill with guide wire bicortically. Then use 8mm reamer over guide wire but only drill unicortical.     Pass  suture at end of biceps tendon through button. Then place button on . Place  through drill hole bicortically. Flip button on far cortex. Then toggle sutures to pull tendon into bony tunnel.  I then placed the interference screw or secondary fixation.    Irrigate thoroughly. Close subcutaneous skin with 2-0 vicry and then 3-0 running monocryl and the skin with Dermabond.  Placed a sterile dressing.

## 2025-01-27 NOTE — PLAN OF CARE
Problem: Adult Inpatient Plan of Care  Goal: Plan of Care Review  1/27/2025 1401 by Rodrigo Escobedo RN  Outcome: Met  1/27/2025 1255 by Rodrigo Escobedo RN  Outcome: Progressing  Goal: Patient-Specific Goal (Individualized)  1/27/2025 1401 by Rodrigo Escobedo RN  Outcome: Met  1/27/2025 1255 by Rodrigo Escobedo RN  Outcome: Progressing  Goal: Absence of Hospital-Acquired Illness or Injury  1/27/2025 1401 by Rodrigo Escobedo RN  Outcome: Met  1/27/2025 1255 by Rodrigo Escobedo RN  Outcome: Progressing  Goal: Optimal Comfort and Wellbeing  1/27/2025 1401 by Rodrigo Escobedo RN  Outcome: Met  1/27/2025 1255 by Rodrigo Escobedo RN  Outcome: Progressing  Goal: Readiness for Transition of Care  1/27/2025 1401 by Rodrigo Escobedo RN  Outcome: Met  1/27/2025 1255 by Rodrigo Escobedo RN  Outcome: Progressing     Problem: Wound  Goal: Optimal Coping  1/27/2025 1401 by Rodrigo Escobedo RN  Outcome: Met  1/27/2025 1255 by Rodrigo Escobedo RN  Outcome: Progressing  Goal: Optimal Functional Ability  1/27/2025 1401 by Rodrigo Escobedo RN  Outcome: Met  1/27/2025 1255 by Rodrigo Escobedo RN  Outcome: Progressing  Goal: Absence of Infection Signs and Symptoms  1/27/2025 1401 by Rodrigo Escobedo RN  Outcome: Met  1/27/2025 1255 by Rodrigo Escobedo RN  Outcome: Progressing  Goal: Improved Oral Intake  1/27/2025 1401 by Rodrigo Escobedo RN  Outcome: Met  1/27/2025 1255 by Rodrigo Escobedo RN  Outcome: Progressing  Goal: Optimal Pain Control and Function  1/27/2025 1401 by Rodrigo Escobedo RN  Outcome: Met  1/27/2025 1255 by Rodrigo Escobedo RN  Outcome: Progressing  Goal: Skin Health and Integrity  1/27/2025 1401 by Rodrigo Escobedo RN  Outcome: Met  1/27/2025 1255 by Rodrigo Escobedo RN  Outcome: Progressing  Goal: Optimal Wound Healing  1/27/2025 1401 by Rodrigo Escobedo, RN  Outcome: Met  1/27/2025 1255 by Rodrigo Escobedo, RN  Outcome: Progressing   Pt tolerated procedure well, ready for discharge.

## 2025-01-27 NOTE — DISCHARGE INSTRUCTIONS
Discharge Instructions    Chacorta Mcgowan MD  [address], [address]  [place] [address]  [phone]    Instructions for After Surgery:  Follow up:  -Postoperative follow up evaluation should be scheduled for 1 week postoperatively.  -Physical therapy referral has been routed to the [facility] physical therapy clinic located in [place].  That clinic will contact you to schedule your first postoperative appointment.  You should be scheduled to start postoperative physical therapy at approximately 1 week postop, AFTER your initial postop visit.    Prescriptions:  -All prescriptions have been electronically routed to your pharmacy.  -See multi-modal pain protocol instructions.  -While taking anti-inflammatories (ketorolac (Toradol) and meloxicam (Mobic)), avoid all other over-the-counter anti-inflammatories (ex. Advil, Aleve, ibuprofen, Motrin, naproxen, etc.)  -While taking anti-inflammatories, you may want to take a medication to protect your stomach (ex. omeprazole, Prilosec, Prevacid, Pepcid, Nexium, etc.)  -Take anti-inflammatories with food and discontinue if GI upset occurs.  -The narcotic prescription Oxycodone and gabapentin (Neurontin) may cause sedation, do NOT drive while taking these medications.    Diet:  -The first meal at home should be a liquid meal (ex. Soup and/or juices) to prevent postoperative nausea.   -Advance to normal diet as tolerated if no postoperative nausea.  -Take prescribed ondansetron (Zofran) as needed for any postoperative nausea and vomiting.    Constipation Prevention:  -Take Miralax or Senakot S/Martina-Colace and stool softeners DAILY while taking pain medications.  -Increase water and fiber intake.  -Move around as much as possible.  -Use other, more aggressive, over-the-counter laxatives as needed for constipation (i.e. Milk of Magnesia, Dulcolax tablet/suppository, Magnesium citrate, Fleet's enema, etc.)    Wound Care:  -Do NOT remove dressing until follow  up.  -Change dressing daily following initial dressing change.  -Once the incision has no drainage, you no longer need a dressing.  -It is okay to take a shower/lightly run water over the wound AFTER daily dressing changes have been discontinued.  -Do NOT submerge the wound in water ? it is okay to take a sponge bath, use waterproof bandages, or place a sealed plastic bag over the operative extremity.    Bracing:  -Splint MUST be worn at all times until follow up.  -It is okay to adjust/loosen the tightness of the Splint.    Blood Clot Prevention:  -Take Aspirin 81 mg twice daily for 2 weeks postoperatively.  -Get up and walk around as much as possible.  -Utilize compression hose for 14 days postoperatively to assist with any lower extremity swelling.    Urinary Retention:  If you start having difficulty urinating, decrease Oxycodone and Robaxin and call your primary care doctor or urologist.     Pneumonia Prevention:  Stay out of bed as much as possible, walk around at least every 2 hours and continue breathing exercises as long as you are limited in mobility and on narcotics.     Ice (cryotherapy):  -Cryotherapy (i.e. cold therapy unit or ice bags) has been clinically proven to reduce pain and help control swelling postoperatively.  -Apply cryotherapy for 30 minutes/session with at least a 10-minute break to prevent cold burn injuries.  -Cryotherapy is most beneficial in the first 48-72 hours postoperatively, and can be performed as needed after that.  -Cryotherapy can be used as needed for periodic pain/swelling episodes several weeks postoperatively.    Other Instructions:  -Keep extremity elevated (i.e. above the level of the heart) as much as possible to prevent swelling and reduce pain.  -Non weight bearing to the operative extremity.  -If you had a nerve block, take your Oxycodone BEFORE your pain becomes too severe.  -Utilize Ganji vu on your smart phone to record and track medications listed  below          [facility]   ORTHOPAEDIC & SPORTS MEDICINE  [address], [place] [address]  Phone [phone]/ Fax [phone]  Multimodal Pain Management Instructions  Postoperative regimen:      *Oxycodone 5 mg (Opioid Narcotic) can be used twice daily for severe breakthrough pain ONLY*  Gradually reduce the use as the pain lessens.    Utilize Negotiant vu on your smart phone to record and track medications            Days 1-5:     Toradol/Ketorolac (anti-inflammatory) - take 10mg every 6 hours, around the clock. (While on Toradol, take a medication to protect your stomach, such as Omeprazole, Prilosec, Prevacid, Pepcid, Nexium....etc).     Robaxin/Methocarbamol (muscle relaxer) 750mg every 6 hours, around the clock for muscle spasms, thigh pain and stiffness, additional pain control. This medication is helpful for pain control while lessening your need for narcotics.    Neurontin/Gabapentin (neuropathic/shocking/ nerve like pain) 300mg every 8 hours - if you get too sleepy during the day, switch to nightly dosing or discontinue the use completely.     Tylenol/Acetaminophen (Pain Pill) 1000mg every 8 hours, around the clock. **NO MORE THAN 3000mg OF TYLENOL IN 24 HOURS**.    *Try to rotate these medications and not take them all at the same time, this will improve your overall pain control*           Days 6-14:    Robaxin/Methocarbamol 750mg every 6 hours  Tylenol 1000mg every 8 hours  Mobic/Meloxicam 7.5 twice a day          Days 15 and beyond:    Tylenol 1000mg three times a day, as needed  Mobic/Meloxicam 7.5mg Daily (optional, AS NEEDED)

## 2025-01-28 ENCOUNTER — TELEPHONE (OUTPATIENT)
Dept: FAMILY MEDICINE | Facility: CLINIC | Age: 50
End: 2025-01-28
Payer: COMMERCIAL

## 2025-01-28 VITALS
BODY MASS INDEX: 33.49 KG/M2 | DIASTOLIC BLOOD PRESSURE: 80 MMHG | SYSTOLIC BLOOD PRESSURE: 118 MMHG | OXYGEN SATURATION: 97 % | HEIGHT: 67 IN | TEMPERATURE: 98 F | HEART RATE: 97 BPM | RESPIRATION RATE: 18 BRPM | WEIGHT: 213.38 LBS

## 2025-01-28 DIAGNOSIS — I10 HYPERTENSION, UNSPECIFIED TYPE: ICD-10-CM

## 2025-01-28 NOTE — TELEPHONE ENCOUNTER
Patient last appointment was in December 2024 completed for wellness . Needs appointment for refills

## 2025-02-03 ENCOUNTER — TELEPHONE (OUTPATIENT)
Dept: ORTHOPEDICS | Facility: CLINIC | Age: 50
End: 2025-02-03
Payer: COMMERCIAL

## 2025-02-03 DIAGNOSIS — Z98.890 STATUS POST TENDON REPAIR: Primary | ICD-10-CM

## 2025-02-03 DIAGNOSIS — S46.212A RUPTURE OF LEFT DISTAL BICEPS TENDON, INITIAL ENCOUNTER: ICD-10-CM

## 2025-02-03 DIAGNOSIS — Z02.6 ENCOUNTER RELATED TO WORKER'S COMPENSATION CLAIM: ICD-10-CM

## 2025-02-03 RX ORDER — HYDROCODONE BITARTRATE AND ACETAMINOPHEN 5; 325 MG/1; MG/1
1 TABLET ORAL EVERY 8 HOURS PRN
Qty: 21 TABLET | Refills: 0 | Status: SHIPPED | OUTPATIENT
Start: 2025-02-03 | End: 2025-02-10 | Stop reason: SDUPTHER

## 2025-02-03 NOTE — TELEPHONE ENCOUNTER
Norco 5 mg/325 mg to be taken every 8 hours as needed for pain.  Advised that he should be taking no more than 3000 mg acetaminophen in a 24 hour timeframe

## 2025-02-03 NOTE — TELEPHONE ENCOUNTER
Patient called for refill on post pain medication. He would like to go down to maybe hydrocodone. He states the oxycodone didn't really help his pain and he feels like that one is more addictive than the other. He would like something to take sooner than every 12 hours.

## 2025-02-04 ENCOUNTER — OFFICE VISIT (OUTPATIENT)
Dept: ORTHOPEDICS | Facility: CLINIC | Age: 50
End: 2025-02-04
Payer: OTHER MISCELLANEOUS

## 2025-02-04 VITALS
SYSTOLIC BLOOD PRESSURE: 123 MMHG | HEART RATE: 83 BPM | HEIGHT: 67 IN | DIASTOLIC BLOOD PRESSURE: 85 MMHG | BODY MASS INDEX: 33.49 KG/M2 | WEIGHT: 213.38 LBS

## 2025-02-04 DIAGNOSIS — Z02.6 ENCOUNTER RELATED TO WORKER'S COMPENSATION CLAIM: ICD-10-CM

## 2025-02-04 DIAGNOSIS — Z98.890 STATUS POST TENDON REPAIR: Primary | ICD-10-CM

## 2025-02-04 DIAGNOSIS — S46.212S RUPTURE OF LEFT DISTAL BICEPS TENDON, SEQUELA: ICD-10-CM

## 2025-02-04 PROCEDURE — 99024 POSTOP FOLLOW-UP VISIT: CPT | Mod: ,,, | Performed by: ORTHOPAEDIC SURGERY

## 2025-02-04 NOTE — PROGRESS NOTES
[facility]  Orthopedic Clinic Note      Chief Complaint:   Chief Complaint   Patient presents with    Left Elbow - Post-op Evaluation     **WC** 8 day sp left elbow SX 1/16/25 GL 4/27/2025, unsure of the name of the PT place and thinks he will start 2/6/25, patient report shoulder and elbow pain with an intermittent burning sensation in the forearm to the wrist, takes norco, mobic and robaxin which is managing his pain, reports numbness in the forearm    Appointment     OP NOTE:   Left distal biceps repair      Referring Physician: No ref. provider found      History of Present Illness:    01/27/2025 PROCEDURES:  Left distal biceps repair   02/04/2025 patient presents 1 week status post the above-noted procedure.  Doing well with no major issues or concerns.  Some pain and burning sensation with numbness in the forearm.  Compliant with postop splint.      Past Medical History:   Diagnosis Date    Anxiety     Biceps tendon rupture     Depression     Hypertension        Past Surgical History:   Procedure Laterality Date    HERNIA REPAIR      INGUINAL HERNIA REPAIR Bilateral     at different times    KNEE ARTHROSCOPY W/ ACL RECONSTRUCTION Left     REPAIR, TENDON, BICEPS, DISTAL Left 1/27/2025    Procedure: REPAIR, TENDON, BICEPS, DISTAL;  Surgeon: Chacorta Mcgowan MD;  Location: [facility];  Service: Orthopedics;  Laterality: Left;  Arthrex, supine, arm/hand table, hemaclear tourniquet    right index finger  Right     UMBILICAL HERNIA REPAIR         Current Outpatient Medications   Medication Sig    clonazePAM (KLONOPIN) 0.5 MG tablet Take 0.5 mg by mouth 2 (two) times daily.    HYDROcodone-acetaminophen (NORCO) 5-325 mg per tablet Take 1 tablet by mouth every 8 (eight) hours as needed for Pain.    meloxicam (MOBIC) 7.5 MG tablet Starting on Post-op Day 6, take Mobic 7.5mg twice a day for 9 days. After 9 days, take Daily, AS NEEDED for pain    methocarbamoL (ROBAXIN) 750 MG Tab Take 1 tablet (750 mg total) by  "mouth every 6 (six) hours. for 14 days    olmesartan (BENICAR) 20 MG tablet Take 1 tablet (20 mg total) by mouth once daily.    ondansetron (ZOFRAN-ODT) 4 MG TbDL Take 1 tablet (4 mg total) by mouth every 6 (six) hours as needed (Nausea/Vomiting).    gabapentin (NEURONTIN) 300 MG capsule Take 1 capsule (300 mg total) by mouth every 8 (eight) hours. for 5 days     No current facility-administered medications for this visit.       Review of patient's allergies indicates:   Allergen Reactions    Meperidine Hives       Family History   Problem Relation Name Age of Onset    Arthritis Mother Livia Going     Heart disease Mother Livia Going     Cancer Father Benjamín Mcduffie        Social History     Socioeconomic History    Marital status:    Tobacco Use    Smoking status: Some Days     Types: Vaping with nicotine    Smokeless tobacco: Never   Substance and Sexual Activity    Alcohol use: Yes     Comment: rarely    Drug use: Never    Sexual activity: Yes     Partners: Female     Birth control/protection: Abstinence           Review of Systems:  All review of systems negative except for those stated in the HPI.    Examination:    Vital Signs:    Vitals:    02/04/25 1034 02/04/25 1035   BP: (!) 155/91 123/85   Pulse: 84 83   Weight:  96.8 kg (213 lb 6.5 oz)   Height:  5' 7" (1.702 m)   PainSc:    7   PainLoc:  Elbow       Body mass index is 33.42 kg/m².    Physical Examination:  General: Well-developed, well-nourished.  Neuro: Alert and oriented x 3.  Psych: Normal mood and affect.  Card: Regular rate and rhythm  Resp: Respirations regular and unlabored  Left upper extremity:  Incisions clean, dry, and intact. No signs of infection. Neurovascular intact distally. Sensation intact.      Imaging:  Prior two views of the left elbow demonstrate expected postoperative changes following tendon repair.  Hardware intact with no evidence of loosening.    Assessment: Status post tendon repair    Rupture of left distal biceps " tendon, sequela    Encounter related to worker's compensation claim      Plan:  X-rays were reviewed with the patient.  Plan to transition from Orthoglass splint to postop elbow brace.  Full flexion and locked at 90 degrees of extension.  Increase extension by 10 degrees per week until full extension achieved.  Should be scheduled to start formal physical therapy this week.  Continue previously prescribed medications as needed for pain.  He will return to clinic in approximately 4 weeks for re-evaluation.  He verbalized understanding of the plan of care with no further questions.    Chacorta Mcgowan MD personally performed the services described in this documentation, including but not limited to patient's history, physical examination, and assessment and plan of care. All medical record entries made by VALERY Olivas were performed at his direction and in his presence. The medical record was reviewed and is accurate and complete.         Follow up in about 4 weeks (around 3/4/2025) for Reevaluation.      DISCLAIMER: This note may have been dictated using voice recognition software and may contain grammatical errors.     NOTE: Consult report sent to referring provider via [employer].

## 2025-02-10 DIAGNOSIS — Z02.6 ENCOUNTER RELATED TO WORKER'S COMPENSATION CLAIM: ICD-10-CM

## 2025-02-10 DIAGNOSIS — Z98.890 STATUS POST TENDON REPAIR: ICD-10-CM

## 2025-02-10 RX ORDER — HYDROCODONE BITARTRATE AND ACETAMINOPHEN 5; 325 MG/1; MG/1
1 TABLET ORAL EVERY 8 HOURS PRN
Qty: 21 TABLET | Refills: 0 | Status: SHIPPED | OUTPATIENT
Start: 2025-02-10 | End: 2025-02-17

## 2025-02-17 DIAGNOSIS — Z98.890 STATUS POST TENDON REPAIR: ICD-10-CM

## 2025-02-17 DIAGNOSIS — Z02.6 ENCOUNTER RELATED TO WORKER'S COMPENSATION CLAIM: ICD-10-CM

## 2025-02-17 RX ORDER — HYDROCODONE BITARTRATE AND ACETAMINOPHEN 5; 325 MG/1; MG/1
1 TABLET ORAL EVERY 8 HOURS PRN
Qty: 21 TABLET | Refills: 0 | Status: SHIPPED | OUTPATIENT
Start: 2025-02-17 | End: 2025-02-24

## 2025-02-17 RX ORDER — GABAPENTIN 300 MG/1
CAPSULE ORAL
Qty: 15 CAPSULE | Refills: 0 | OUTPATIENT
Start: 2025-02-17

## 2025-03-06 ENCOUNTER — OFFICE VISIT (OUTPATIENT)
Dept: ORTHOPEDICS | Facility: CLINIC | Age: 50
End: 2025-03-06
Payer: OTHER MISCELLANEOUS

## 2025-03-06 VITALS
BODY MASS INDEX: 34.87 KG/M2 | SYSTOLIC BLOOD PRESSURE: 126 MMHG | HEART RATE: 94 BPM | HEIGHT: 67 IN | WEIGHT: 222.19 LBS | DIASTOLIC BLOOD PRESSURE: 89 MMHG

## 2025-03-06 DIAGNOSIS — Z02.6 ENCOUNTER RELATED TO WORKER'S COMPENSATION CLAIM: ICD-10-CM

## 2025-03-06 DIAGNOSIS — Z98.890 STATUS POST TENDON REPAIR: Primary | ICD-10-CM

## 2025-03-06 DIAGNOSIS — S46.212S RUPTURE OF LEFT DISTAL BICEPS TENDON, SEQUELA: ICD-10-CM

## 2025-03-06 RX ORDER — GABAPENTIN 300 MG/1
300 CAPSULE ORAL NIGHTLY
Qty: 30 CAPSULE | Refills: 1 | Status: SHIPPED | OUTPATIENT
Start: 2025-03-06

## 2025-03-06 NOTE — LETTER
March 6, 2025    Trever Mcduffie  [address] [address]          [facility]  [address], [address]  [place] [address]  Phone: [phone]  Fax: [phone] March 6, 2025     Patient: Trever Mcduffie   YOB: 1975   Date of Visit: 3/6/2025       To Whom It May Concern:    It is my medical opinion that Trever Mcduffie should remain out of work until 4/10/25 .    If you have any questions or concerns, please don't hesitate to call.    Sincerely,        Chacorta Mcgowan MD

## 2025-03-06 NOTE — PROGRESS NOTES
Subjective:      Patient ID: Trever Mcduffie is a 49 y.o. male.    Chief Complaint: Follow-up of the Left Elbow (**WC** 7 week sp left elbow bicep tendon repair SX 1/16/25 GL 4/27/25, present in brace, patient states its doing already and still has a lot of pain in the wrist and forearm and numbness)    HPI:     History of Present Illness    CHIEF COMPLAINT:  - Wrist, thumb, and hand pain    HPI:  Trever presents for follow-up approximately seven weeks post-surgery. He reports ongoing pain in his wrist, thumb, and hand. He describes the sensation as unusual and mentions he can still feel the biceps in it. His progress in physical therapy has been slow. He expresses concern about his ability to return to work, noting that his extended sick leave ends at the end of the current month. He still has muscle relaxers, specifically Robaxin, from a previous prescription. He inquires about the possibility of receiving Tramadol for pain management but is informed that narcotic pain medication is typically not prescribed beyond six weeks post-surgery.    PREVIOUS TREATMENTS:  - Physical therapy: Ongoing, progressing slowly    MEDICATIONS:  - Robaxin    SURGICAL HISTORY:  - Tendon repair and nerve relocation: 7 weeks ago    WORK STATUS:  - Currently not working due to recovery from surgery  - Practitioner recommends remaining out of work for another month  - Extended sick leave ends at the end of the current month  - Practitioner suggests writing new paperwork to extend time off work      ROS:  Musculoskeletal: +joint pain, +muscle pain          Past Medical History:   Diagnosis Date    Anxiety     Biceps tendon rupture     Depression     Hypertension      Past Surgical History:   Procedure Laterality Date    HERNIA REPAIR      INGUINAL HERNIA REPAIR Bilateral     at different times    KNEE ARTHROSCOPY W/ ACL RECONSTRUCTION Left     REPAIR, TENDON, BICEPS, DISTAL Left 1/27/2025    Procedure: REPAIR, TENDON, BICEPS, DISTAL;   "Surgeon: Chacorta Mcgowan MD;  Location: [facility];  Service: Orthopedics;  Laterality: Left;  Arthrex, supine, arm/hand table, hemaclear tourniquet    right index finger  Right     UMBILICAL HERNIA REPAIR       Social History[1]    Current Medications[2]  Review of patient's allergies indicates:   Allergen Reactions    Meperidine Hives       /89 (BP Location: Right arm, Patient Position: Sitting)   Pulse 94   Ht 5' 7" (1.702 m)   Wt 100.8 kg (222 lb 3.2 oz)   BMI 34.80 kg/m²     Comprehensive review of systems completed and negative except as per HPI.        Objective:   General: Well-developed, well-nourished.  Neuro: Alert and oriented x 3.  Psych: Normal mood and affect.  Card: Regular rate and rhythm  Resp: Respirations regular and unlabored    Elbow Exam:    No obvious deformity. Range of motion is 10 to 120 degrees. Negative varus and valgus stress test. Supination and pronation to 90 degrees. Negative tenderness to palpation over the lateral epicondyle. Negative tenderness to palpation over the medial epicondyle. Negative Tinel´s test. No olecranon tenderness. 3/5 biceps strength, normal skin appearance and palpable pulses distally. Sensibility normal.      Assessment:         1. Status post tendon repair    2. Rupture of left distal biceps tendon, sequela    3. Encounter related to worker's compensation claim          Plan:       Orders Placed This Encounter    gabapentin (NEURONTIN) 300 MG capsule        Imaging and exam findings discussed.     Assessment & Plan      MEDICATIONS:  - Started gabapentin for nerve pain.  He will continue with the muscle relaxers.    FOLLOW UP:  - Follow up in 1 month for re-evaluation with the possibility of returning back to work.  - Work note to be provided for extended leave.    PATIENT INSTRUCTIONS:  - I also moved the postop elbow brace to full range of motion.              All questions were answered. Patient happy and in agreement with the plan.     This note was " generated with the assistance of ambient listening technology. Verbal consent was obtained by the patient and accompanying visitor(s) for the recording of patient appointment to facilitate this note. I attest to having reviewed and edited the generated note for accuracy, though some syntax or spelling errors may persist. Please contact the author of this note for any clarification.         [1]   Social History  Socioeconomic History    Marital status:    Tobacco Use    Smoking status: Some Days     Types: Vaping with nicotine    Smokeless tobacco: Never   Substance and Sexual Activity    Alcohol use: Yes     Comment: rarely    Drug use: Never    Sexual activity: Yes     Partners: Female     Birth control/protection: Abstinence   [2]   Current Outpatient Medications:     clonazePAM (KLONOPIN) 0.5 MG tablet, Take 0.5 mg by mouth 2 (two) times daily., Disp: , Rfl:     olmesartan (BENICAR) 20 MG tablet, Take 1 tablet (20 mg total) by mouth once daily., Disp: 90 tablet, Rfl: 0    gabapentin (NEURONTIN) 300 MG capsule, Take 1 capsule (300 mg total) by mouth every evening., Disp: 30 capsule, Rfl: 1

## 2025-03-13 DIAGNOSIS — I10 HYPERTENSION, UNSPECIFIED TYPE: Primary | ICD-10-CM

## 2025-03-13 DIAGNOSIS — Z12.5 SCREENING PSA (PROSTATE SPECIFIC ANTIGEN): ICD-10-CM

## 2025-03-13 DIAGNOSIS — Z00.00 WELLNESS EXAMINATION: ICD-10-CM

## 2025-04-10 ENCOUNTER — TELEPHONE (OUTPATIENT)
Dept: ORTHOPEDICS | Facility: CLINIC | Age: 50
End: 2025-04-10
Payer: COMMERCIAL

## 2025-04-10 ENCOUNTER — OFFICE VISIT (OUTPATIENT)
Dept: ORTHOPEDICS | Facility: CLINIC | Age: 50
End: 2025-04-10
Payer: OTHER MISCELLANEOUS

## 2025-04-10 VITALS
HEIGHT: 67 IN | BODY MASS INDEX: 34.59 KG/M2 | DIASTOLIC BLOOD PRESSURE: 89 MMHG | SYSTOLIC BLOOD PRESSURE: 131 MMHG | WEIGHT: 220.38 LBS | HEART RATE: 95 BPM

## 2025-04-10 DIAGNOSIS — Z98.890 STATUS POST TENDON REPAIR: Primary | ICD-10-CM

## 2025-04-10 DIAGNOSIS — Z02.6 ENCOUNTER RELATED TO WORKER'S COMPENSATION CLAIM: ICD-10-CM

## 2025-04-10 DIAGNOSIS — S46.212S RUPTURE OF LEFT DISTAL BICEPS TENDON, SEQUELA: ICD-10-CM

## 2025-04-10 NOTE — LETTER
April 10, 2025    Trever Mcduffie  [address] [address]          [facility]  [address], [address]  [place] [address]  Phone: [phone]  Fax: [phone] April 10, 2025     Patient: Trever Mcduffie   YOB: 1975   Date of Visit: 4/10/2025       To Whom It May Concern:    It is my medical opinion that Trever Mcduffie should continue to remain out of work. His next follow up is on 05/13/2025.    If you have any questions or concerns, please don't hesitate to call.    Sincerely,        Chacorta Mcgowan MD

## 2025-04-10 NOTE — LETTER
April 10, 2025    Trever Mcduffie  [address] [address]          [facility]  [address], [address]  [place] [address]  Phone: [phone]  Fax: [phone] April 10, 2025     Patient: Trever Mcduffie   YOB: 1975   Date of Visit: 4/10/2025       To Whom It May Concern:    It is my medical opinion that Trever Mcduffie  should remain out of work at this time. His next follow up is on 05/29/2025 .    If you have any questions or concerns, please don't hesitate to call.    Sincerely,        Chacorta Mcgowan MD

## 2025-05-29 ENCOUNTER — OFFICE VISIT (OUTPATIENT)
Dept: ORTHOPEDICS | Facility: CLINIC | Age: 50
End: 2025-05-29
Payer: OTHER MISCELLANEOUS

## 2025-05-29 VITALS
WEIGHT: 228.63 LBS | HEIGHT: 67 IN | HEART RATE: 77 BPM | BODY MASS INDEX: 35.88 KG/M2 | DIASTOLIC BLOOD PRESSURE: 85 MMHG | SYSTOLIC BLOOD PRESSURE: 123 MMHG | TEMPERATURE: 97 F

## 2025-05-29 DIAGNOSIS — S46.212S RUPTURE OF LEFT DISTAL BICEPS TENDON, SEQUELA: ICD-10-CM

## 2025-05-29 DIAGNOSIS — Z02.6 ENCOUNTER RELATED TO WORKER'S COMPENSATION CLAIM: ICD-10-CM

## 2025-05-29 DIAGNOSIS — Z98.890 STATUS POST TENDON REPAIR: Primary | ICD-10-CM

## 2025-05-29 NOTE — PROGRESS NOTES
"Subjective:      Patient ID: Trever Mcduffie is a 49 y.o. male.    Chief Complaint: Appointment of the Left Elbow (5 months S/P LT elbow bicep tendon repair (Sx 1/16/25 - gl 4/27/25) - Patient is doing well. Continues to have numbness of his forearm. Denies any pain. Back to daily activities with no issues. Patient has completed physical therapy. )    HPI:     History of Present Illness    CHIEF COMPLAINT:  - Follow-up s/p left bicep tendon repair.    HPI:  Trever is approximately 4.5 months post-op from a left bicep tendon repair. He reports feeling good when asked about his recovery. He expresses concern about the possibility of re-rupturing the biceps tendon.    SURGICAL HISTORY:  - Left bicep tendon repair: approximately 4.5 months ago    WORK STATUS:  - Works in behavioral health.          Past Medical History:   Diagnosis Date    Anxiety     Biceps tendon rupture     Depression     Hypertension      Past Surgical History:   Procedure Laterality Date    HERNIA REPAIR      INGUINAL HERNIA REPAIR Bilateral     at different times    KNEE ARTHROSCOPY W/ ACL RECONSTRUCTION Left     REPAIR, TENDON, BICEPS, DISTAL Left 1/27/2025    Procedure: REPAIR, TENDON, BICEPS, DISTAL;  Surgeon: Chacorta Mcgowan MD;  Location: [facility];  Service: Orthopedics;  Laterality: Left;  Arthrex, supine, arm/hand table, hemaclear tourniquet    right index finger  Right     UMBILICAL HERNIA REPAIR       Social History[1]    Current Medications[2]  Review of patient's allergies indicates:   Allergen Reactions    Meperidine Hives       /85 (BP Location: Right arm, Patient Position: Sitting)   Pulse 77   Temp 97.2 °F (36.2 °C) (Oral)   Ht 5' 7" (1.702 m)   Wt 103.7 kg (228 lb 9.6 oz)   BMI 35.80 kg/m²     Comprehensive review of systems completed and negative except as per HPI.        Objective:   General: Well-developed, well-nourished.  Neuro: Alert and oriented x 3.  Psych: Normal mood and affect.  Card: Regular rate and " rhythm  Resp: Respirations regular and unlabored    Elbow Exam:    No obvious deformity. Range of motion is 0 to 130 degrees. Negative varus and valgus stress test. Supination and pronation to 90 degrees. Negative tenderness to palpation over the lateral epicondyle. Negative tenderness to palpation over the medial epicondyle. Negative Tinel´s test. No olecranon tenderness. 5/5 strength, normal skin appearance and palpable pulses distally. Sensibility normal.      Assessment:         1. Status post tendon repair    2. Rupture of left distal biceps tendon, sequela    3. Encounter related to worker's compensation claim          Plan:             Imaging and exam findings discussed.     Assessment & Plan    FOLLOW UP:  - Release patient from care.  - Provide official release documentation for patient.               All questions were answered. Patient happy and in agreement with the plan.     This note was generated with the assistance of ambient listening technology. Verbal consent was obtained by the patient and accompanying visitor(s) for the recording of patient appointment to facilitate this note. I attest to having reviewed and edited the generated note for accuracy, though some syntax or spelling errors may persist. Please contact the author of this note for any clarification.         [1]   Social History  Socioeconomic History    Marital status:    Tobacco Use    Smoking status: Some Days     Types: Vaping with nicotine    Smokeless tobacco: Never   Substance and Sexual Activity    Alcohol use: Yes     Comment: rarely    Drug use: Never    Sexual activity: Yes     Partners: Female     Birth control/protection: Abstinence   [2]   Current Outpatient Medications:     clonazePAM (KLONOPIN) 0.5 MG tablet, Take 0.5 mg by mouth 2 (two) times daily., Disp: , Rfl:     olmesartan (BENICAR) 20 MG tablet, Take 1 tablet (20 mg total) by mouth once daily., Disp: 90 tablet, Rfl: 0    gabapentin (NEURONTIN) 300 MG  capsule, Take 1 capsule (300 mg total) by mouth every evening. (Patient not taking: Reported on 5/29/2025), Disp: 30 capsule, Rfl: 1

## (undated) DEVICE — DRAPE HAND STERILE

## (undated) DEVICE — APPLIER LIGACLIP SM 9.38IN

## (undated) DEVICE — ELECTRODE PATIENT RETURN DISP

## (undated) DEVICE — CUFF ATS 2 PORT SNGL BLDR 18IN

## (undated) DEVICE — POWDER ARISTA AH 3G

## (undated) DEVICE — TRAY BASIN PLACENTA LAFAYETTE

## (undated) DEVICE — KIT SURGICAL TURNOVER

## (undated) DEVICE — DRESSING XEROFORM NONADH 1X8IN

## (undated) DEVICE — SUT MONOCRYL 3-0 PS-2 UND

## (undated) DEVICE — BANDAGE ELAS COMPR 4IN 5.8YD

## (undated) DEVICE — Device

## (undated) DEVICE — GLOVE SENSICARE PI GRN 9

## (undated) DEVICE — GLOVE SENSICARE PI GRN 6.5

## (undated) DEVICE — DRAPE STERI U-SHAPED 47X51IN

## (undated) DEVICE — GOWN POLY REINF X-LONG 2XL

## (undated) DEVICE — APPLICATOR CHLORAPREP ORN 26ML

## (undated) DEVICE — NDL 26.5 TAPR CRV XLOOP

## (undated) DEVICE — SUT VICRYL 2-0 8-18 CP-2

## (undated) DEVICE — HEMOSTAT SURGICEL 4X8IN

## (undated) DEVICE — SUT VICRYL+ 1 CT1 18IN

## (undated) DEVICE — GLOVE SENSICARE PI MICRO 8.5

## (undated) DEVICE — BENZOIN TINCTURE CAPSULET

## (undated) DEVICE — LOOP STERION MINI RED 8X406MM

## (undated) DEVICE — SOL NACL IRR 1000ML BTL

## (undated) DEVICE — GLOVE SENSICARE PI MICRO 6.5

## (undated) DEVICE — STRIP MEDI WND CLSR 1/2X4IN

## (undated) DEVICE — SUT FIBERWIRE 0 1.5IN CLOSE LP